# Patient Record
Sex: MALE | Race: WHITE | Employment: OTHER | URBAN - NONMETROPOLITAN AREA
[De-identification: names, ages, dates, MRNs, and addresses within clinical notes are randomized per-mention and may not be internally consistent; named-entity substitution may affect disease eponyms.]

---

## 2017-04-27 ENCOUNTER — HOSPITAL ENCOUNTER (OUTPATIENT)
Dept: NON INVASIVE DIAGNOSTICS | Age: 62
Discharge: HOME | End: 2017-04-27
Payer: COMMERCIAL

## 2017-04-27 LAB
LV EF: 50 %
LVEF MODALITY: NORMAL

## 2017-04-27 PROCEDURE — 93350 STRESS TTE ONLY: CPT

## 2017-05-25 LAB
C DIFFICILE TOXIN, EIA: NORMAL
CRYPTOSPORIDIUM ANTIGEN STOOL: NORMAL
GIARDIA ANTIGEN STOOL: NORMAL

## 2017-05-28 LAB
CULTURE, STOOL: NORMAL
E COLI SHIGA TOXIN ASSAY: NORMAL

## 2017-06-26 ENCOUNTER — OFFICE VISIT (OUTPATIENT)
Dept: INTERNAL MEDICINE | Age: 62
End: 2017-06-26
Payer: COMMERCIAL

## 2017-06-26 ENCOUNTER — OFFICE VISIT (OUTPATIENT)
Dept: GASTROENTEROLOGY | Age: 62
End: 2017-06-26
Payer: COMMERCIAL

## 2017-06-26 VITALS
WEIGHT: 138 LBS | DIASTOLIC BLOOD PRESSURE: 78 MMHG | SYSTOLIC BLOOD PRESSURE: 124 MMHG | HEART RATE: 95 BPM | BODY MASS INDEX: 19.76 KG/M2 | HEIGHT: 70 IN | OXYGEN SATURATION: 97 %

## 2017-06-26 VITALS
DIASTOLIC BLOOD PRESSURE: 76 MMHG | SYSTOLIC BLOOD PRESSURE: 110 MMHG | HEART RATE: 91 BPM | BODY MASS INDEX: 19.47 KG/M2 | HEIGHT: 70 IN | OXYGEN SATURATION: 96 % | WEIGHT: 136 LBS

## 2017-06-26 DIAGNOSIS — R19.7 DIARRHEA, UNSPECIFIED TYPE: ICD-10-CM

## 2017-06-26 DIAGNOSIS — R19.7 DIARRHEA, UNSPECIFIED TYPE: Primary | ICD-10-CM

## 2017-06-26 DIAGNOSIS — R53.83 FATIGUE, UNSPECIFIED TYPE: ICD-10-CM

## 2017-06-26 DIAGNOSIS — R63.4 UNINTENTIONAL WEIGHT LOSS: ICD-10-CM

## 2017-06-26 DIAGNOSIS — F41.1 GENERALIZED ANXIETY DISORDER: Primary | ICD-10-CM

## 2017-06-26 PROCEDURE — 99213 OFFICE O/P EST LOW 20 MIN: CPT | Performed by: INTERNAL MEDICINE

## 2017-06-26 RX ORDER — MONTELUKAST SODIUM 4 MG/1
1 TABLET, CHEWABLE ORAL 3 TIMES DAILY PRN
Qty: 90 TABLET | Refills: 3 | Status: SHIPPED | OUTPATIENT
Start: 2017-06-26 | End: 2017-07-05 | Stop reason: SDUPTHER

## 2017-06-26 RX ORDER — CLONAZEPAM 0.5 MG/1
0.5 TABLET ORAL NIGHTLY PRN
Qty: 90 TABLET | Refills: 0 | Status: SHIPPED | OUTPATIENT
Start: 2017-06-26 | End: 2017-12-19 | Stop reason: SDUPTHER

## 2017-06-26 ASSESSMENT — ENCOUNTER SYMPTOMS
SHORTNESS OF BREATH: 0
SORE THROAT: 0
ANAL BLEEDING: 0
PHOTOPHOBIA: 0
SHORTNESS OF BREATH: 0
ABDOMINAL PAIN: 0
DIARRHEA: 1
CHEST TIGHTNESS: 0
EYE DISCHARGE: 0
COUGH: 0
ABDOMINAL DISTENTION: 0
WHEEZING: 0
EYE ITCHING: 0
COLOR CHANGE: 0
TROUBLE SWALLOWING: 0
BACK PAIN: 0
COUGH: 0
COLOR CHANGE: 0
CONSTIPATION: 0
VOMITING: 0
NAUSEA: 0
DIARRHEA: 1
FACIAL SWELLING: 0
BLOOD IN STOOL: 0
RECTAL PAIN: 0
EYE PAIN: 0
SINUS PRESSURE: 0
EYE REDNESS: 0
RHINORRHEA: 0
ABDOMINAL PAIN: 1
BLOOD IN STOOL: 0

## 2017-06-26 ASSESSMENT — PATIENT HEALTH QUESTIONNAIRE - PHQ9
SUM OF ALL RESPONSES TO PHQ9 QUESTIONS 1 & 2: 0
1. LITTLE INTEREST OR PLEASURE IN DOING THINGS: 0
2. FEELING DOWN, DEPRESSED OR HOPELESS: 0
SUM OF ALL RESPONSES TO PHQ QUESTIONS 1-9: 0

## 2017-06-28 ENCOUNTER — HOSPITAL ENCOUNTER (OUTPATIENT)
Age: 62
Setting detail: OUTPATIENT SURGERY
Discharge: HOME | End: 2017-06-28
Attending: INTERNAL MEDICINE | Admitting: INTERNAL MEDICINE
Payer: COMMERCIAL

## 2017-06-28 ENCOUNTER — ANESTHESIA (OUTPATIENT)
Dept: OPERATING ROOM | Age: 62
End: 2017-06-28
Payer: COMMERCIAL

## 2017-06-28 ENCOUNTER — ANESTHESIA EVENT (OUTPATIENT)
Dept: OPERATING ROOM | Age: 62
End: 2017-06-28

## 2017-06-28 ENCOUNTER — HOSPITAL ENCOUNTER (OUTPATIENT)
Age: 62
Setting detail: SPECIMEN
Discharge: HOME | End: 2017-06-28
Payer: COMMERCIAL

## 2017-06-28 VITALS — DIASTOLIC BLOOD PRESSURE: 67 MMHG | SYSTOLIC BLOOD PRESSURE: 101 MMHG | OXYGEN SATURATION: 98 %

## 2017-06-28 VITALS
RESPIRATION RATE: 18 BRPM | TEMPERATURE: 98.5 F | BODY MASS INDEX: 19.33 KG/M2 | WEIGHT: 135 LBS | DIASTOLIC BLOOD PRESSURE: 69 MMHG | SYSTOLIC BLOOD PRESSURE: 95 MMHG | HEIGHT: 70 IN | HEART RATE: 74 BPM | OXYGEN SATURATION: 100 %

## 2017-06-28 LAB — C DIFFICILE TOXIN, EIA: NORMAL

## 2017-06-28 PROCEDURE — 00810 PR ANESTH,INTESTINE,SCOPE,LOW: CPT | Performed by: NURSE ANESTHETIST, CERTIFIED REGISTERED

## 2017-06-28 PROCEDURE — 87329 GIARDIA AG IA: CPT

## 2017-06-28 PROCEDURE — 87335 E COLI 0157 AG IA: CPT

## 2017-06-28 PROCEDURE — 87324 CLOSTRIDIUM AG IA: CPT

## 2017-06-28 PROCEDURE — 87046 STOOL CULTR AEROBIC BACT EA: CPT

## 2017-06-28 PROCEDURE — 45380 COLONOSCOPY AND BIOPSY: CPT | Performed by: INTERNAL MEDICINE

## 2017-06-28 PROCEDURE — 87328 CRYPTOSPORIDIUM AG IA: CPT

## 2017-06-28 PROCEDURE — 87045 FECES CULTURE AEROBIC BACT: CPT

## 2017-06-28 PROCEDURE — 45380 COLONOSCOPY AND BIOPSY: CPT

## 2017-06-28 PROCEDURE — G8918 PT W/O PREOP ORDER IV AB PRO: HCPCS | Performed by: NURSE PRACTITIONER

## 2017-06-28 PROCEDURE — G8907 PT DOC NO EVENTS ON DISCHARG: HCPCS | Performed by: NURSE PRACTITIONER

## 2017-06-28 PROCEDURE — 88305 TISSUE EXAM BY PATHOLOGIST: CPT

## 2017-06-28 RX ORDER — SODIUM CHLORIDE, SODIUM LACTATE, POTASSIUM CHLORIDE, CALCIUM CHLORIDE 600; 310; 30; 20 MG/100ML; MG/100ML; MG/100ML; MG/100ML
INJECTION, SOLUTION INTRAVENOUS CONTINUOUS
Status: DISCONTINUED | OUTPATIENT
Start: 2017-06-28 | End: 2017-06-28 | Stop reason: HOSPADM

## 2017-06-28 RX ORDER — PROPOFOL 10 MG/ML
INJECTION, EMULSION INTRAVENOUS PRN
Status: DISCONTINUED | OUTPATIENT
Start: 2017-06-28 | End: 2017-06-28 | Stop reason: SDUPTHER

## 2017-06-28 RX ADMIN — PROPOFOL 200 MG: 10 INJECTION, EMULSION INTRAVENOUS at 11:00

## 2017-06-28 RX ADMIN — SODIUM CHLORIDE, SODIUM LACTATE, POTASSIUM CHLORIDE, CALCIUM CHLORIDE: 600; 310; 30; 20 INJECTION, SOLUTION INTRAVENOUS at 10:48

## 2017-06-29 LAB
CRYPTOSPORIDIUM ANTIGEN STOOL: NORMAL
GIARDIA ANTIGEN STOOL: NORMAL

## 2017-07-05 RX ORDER — MONTELUKAST SODIUM 4 MG/1
1 TABLET, CHEWABLE ORAL 3 TIMES DAILY PRN
Qty: 270 TABLET | Refills: 0 | Status: SHIPPED | OUTPATIENT
Start: 2017-07-05 | End: 2017-10-06 | Stop reason: SDUPTHER

## 2017-07-06 LAB
CULTURE, STOOL: NORMAL
E COLI SHIGA TOXIN ASSAY: NORMAL

## 2017-07-10 ENCOUNTER — TELEPHONE (OUTPATIENT)
Dept: GASTROENTEROLOGY | Age: 62
End: 2017-07-10

## 2017-07-12 DIAGNOSIS — R19.7 DIARRHEA, UNSPECIFIED TYPE: Primary | ICD-10-CM

## 2017-07-25 RX ORDER — DICLOFENAC SODIUM 75 MG/1
75 TABLET, DELAYED RELEASE ORAL 2 TIMES DAILY
Qty: 60 TABLET | Refills: 2 | Status: SHIPPED | OUTPATIENT
Start: 2017-07-25 | End: 2017-07-28 | Stop reason: SDUPTHER

## 2017-07-28 RX ORDER — DICLOFENAC SODIUM 75 MG/1
75 TABLET, DELAYED RELEASE ORAL 2 TIMES DAILY
Qty: 60 TABLET | Refills: 2 | Status: SHIPPED | OUTPATIENT
Start: 2017-07-28 | End: 2017-10-17

## 2017-08-03 ENCOUNTER — TELEPHONE (OUTPATIENT)
Dept: INTERNAL MEDICINE | Age: 62
End: 2017-08-03

## 2017-08-14 ENCOUNTER — OFFICE VISIT (OUTPATIENT)
Dept: INTERNAL MEDICINE | Age: 62
End: 2017-08-14
Payer: COMMERCIAL

## 2017-08-14 ENCOUNTER — TELEPHONE (OUTPATIENT)
Dept: INTERNAL MEDICINE | Age: 62
End: 2017-08-14

## 2017-08-14 VITALS
DIASTOLIC BLOOD PRESSURE: 64 MMHG | WEIGHT: 137.5 LBS | SYSTOLIC BLOOD PRESSURE: 110 MMHG | HEIGHT: 70 IN | HEART RATE: 81 BPM | OXYGEN SATURATION: 93 % | BODY MASS INDEX: 19.69 KG/M2

## 2017-08-14 DIAGNOSIS — E53.8 B12 DEFICIENCY: Primary | ICD-10-CM

## 2017-08-14 DIAGNOSIS — R53.83 FATIGUE, UNSPECIFIED TYPE: Primary | ICD-10-CM

## 2017-08-14 DIAGNOSIS — R53.83 FATIGUE, UNSPECIFIED TYPE: ICD-10-CM

## 2017-08-14 LAB
ALBUMIN SERPL-MCNC: 4.4 G/DL (ref 3.5–5.2)
ALP BLD-CCNC: 93 U/L (ref 40–130)
ALT SERPL-CCNC: 24 U/L (ref 5–41)
ANION GAP SERPL CALCULATED.3IONS-SCNC: 14 MMOL/L (ref 7–19)
AST SERPL-CCNC: 17 U/L (ref 5–40)
BILIRUB SERPL-MCNC: 0.7 MG/DL (ref 0.2–1.2)
BUN BLDV-MCNC: 11 MG/DL (ref 8–23)
CALCIUM SERPL-MCNC: 9.7 MG/DL (ref 8.8–10.2)
CHLORIDE BLD-SCNC: 103 MMOL/L (ref 98–111)
CO2: 28 MMOL/L (ref 22–29)
CREAT SERPL-MCNC: 0.8 MG/DL (ref 0.5–1.2)
GFR NON-AFRICAN AMERICAN: >60
GLUCOSE BLD-MCNC: 85 MG/DL (ref 74–109)
HCT VFR BLD CALC: 45.3 % (ref 42–52)
HEMOGLOBIN: 15.3 G/DL (ref 14–18)
MCH RBC QN AUTO: 31.4 PG (ref 27–31)
MCHC RBC AUTO-ENTMCNC: 33.8 G/DL (ref 33–37)
MCV RBC AUTO: 92.8 FL (ref 80–94)
PDW BLD-RTO: 12 % (ref 11.5–14.5)
PLATELET # BLD: 196 K/UL (ref 130–400)
PMV BLD AUTO: 9.4 FL (ref 9.4–12.4)
POTASSIUM SERPL-SCNC: 4.4 MMOL/L (ref 3.5–5)
RBC # BLD: 4.88 M/UL (ref 4.7–6.1)
SODIUM BLD-SCNC: 145 MMOL/L (ref 136–145)
TOTAL PROTEIN: 6.9 G/DL (ref 6.6–8.7)
TSH SERPL DL<=0.05 MIU/L-ACNC: 1.66 UIU/ML (ref 0.27–4.2)
VITAMIN B-12: 240 PG/ML (ref 211–946)
WBC # BLD: 4 K/UL (ref 4.8–10.8)

## 2017-08-14 PROCEDURE — 99214 OFFICE O/P EST MOD 30 MIN: CPT | Performed by: INTERNAL MEDICINE

## 2017-08-18 ENCOUNTER — NURSE ONLY (OUTPATIENT)
Dept: INTERNAL MEDICINE | Age: 62
End: 2017-08-18
Payer: COMMERCIAL

## 2017-08-18 DIAGNOSIS — E53.8 B12 DEFICIENCY: ICD-10-CM

## 2017-08-18 PROCEDURE — 96372 THER/PROPH/DIAG INJ SC/IM: CPT | Performed by: INTERNAL MEDICINE

## 2017-08-18 RX ORDER — CYANOCOBALAMIN 1000 UG/ML
1000 INJECTION INTRAMUSCULAR; SUBCUTANEOUS ONCE
Status: COMPLETED | OUTPATIENT
Start: 2017-08-18 | End: 2017-08-18

## 2017-08-18 RX ADMIN — CYANOCOBALAMIN 1000 MCG: 1000 INJECTION INTRAMUSCULAR; SUBCUTANEOUS at 11:26

## 2017-08-21 ENCOUNTER — TELEPHONE (OUTPATIENT)
Dept: GASTROENTEROLOGY | Age: 62
End: 2017-08-21

## 2017-08-23 ASSESSMENT — ENCOUNTER SYMPTOMS
WHEEZING: 0
EYE DISCHARGE: 0
RECTAL PAIN: 0
SORE THROAT: 0
NAUSEA: 0
CHEST TIGHTNESS: 0
SINUS PRESSURE: 0
VOICE CHANGE: 0
DIARRHEA: 0
CONSTIPATION: 0
EYE REDNESS: 0
BACK PAIN: 0
CHOKING: 0
EYE ITCHING: 0
COUGH: 0
ABDOMINAL PAIN: 0
ANAL BLEEDING: 0
PHOTOPHOBIA: 0
TROUBLE SWALLOWING: 0
APNEA: 0
ABDOMINAL DISTENTION: 0
EYE PAIN: 0
FACIAL SWELLING: 0
STRIDOR: 0
VOMITING: 0
SHORTNESS OF BREATH: 0
BLOOD IN STOOL: 0
RHINORRHEA: 0
COLOR CHANGE: 0

## 2017-08-24 DIAGNOSIS — R19.7 DIARRHEA, UNSPECIFIED TYPE: ICD-10-CM

## 2017-09-11 ENCOUNTER — HOSPITAL ENCOUNTER (OUTPATIENT)
Dept: SLEEP CENTER | Age: 62
Discharge: HOME | End: 2017-09-11
Payer: COMMERCIAL

## 2017-09-11 PROCEDURE — 95810 POLYSOM 6/> YRS 4/> PARAM: CPT

## 2017-09-11 PROCEDURE — 95810 POLYSOM 6/> YRS 4/> PARAM: CPT | Performed by: PSYCHIATRY & NEUROLOGY

## 2017-09-13 ENCOUNTER — TELEPHONE (OUTPATIENT)
Dept: GASTROENTEROLOGY | Age: 62
End: 2017-09-13

## 2017-09-14 ENCOUNTER — TELEPHONE (OUTPATIENT)
Dept: GASTROENTEROLOGY | Age: 62
End: 2017-09-14

## 2017-09-18 ENCOUNTER — NURSE ONLY (OUTPATIENT)
Dept: INTERNAL MEDICINE | Age: 62
End: 2017-09-18
Payer: COMMERCIAL

## 2017-09-18 DIAGNOSIS — A04.72 C. DIFFICILE COLITIS: ICD-10-CM

## 2017-09-18 DIAGNOSIS — E53.8 B12 DEFICIENCY: Primary | ICD-10-CM

## 2017-09-18 DIAGNOSIS — R19.7 DIARRHEA, UNSPECIFIED TYPE: Primary | ICD-10-CM

## 2017-09-18 PROCEDURE — 96372 THER/PROPH/DIAG INJ SC/IM: CPT | Performed by: INTERNAL MEDICINE

## 2017-09-18 RX ORDER — CYANOCOBALAMIN 1000 UG/ML
1000 INJECTION INTRAMUSCULAR; SUBCUTANEOUS ONCE
Status: COMPLETED | OUTPATIENT
Start: 2017-09-18 | End: 2017-09-18

## 2017-09-18 RX ADMIN — CYANOCOBALAMIN 1000 MCG: 1000 INJECTION INTRAMUSCULAR; SUBCUTANEOUS at 11:24

## 2017-09-19 ENCOUNTER — TELEPHONE (OUTPATIENT)
Dept: GASTROENTEROLOGY | Age: 62
End: 2017-09-19

## 2017-09-25 ENCOUNTER — OFFICE VISIT (OUTPATIENT)
Dept: INTERNAL MEDICINE | Age: 62
End: 2017-09-25
Payer: COMMERCIAL

## 2017-09-25 VITALS
BODY MASS INDEX: 19.47 KG/M2 | SYSTOLIC BLOOD PRESSURE: 122 MMHG | HEIGHT: 70 IN | WEIGHT: 136 LBS | DIASTOLIC BLOOD PRESSURE: 68 MMHG | HEART RATE: 72 BPM | OXYGEN SATURATION: 97 %

## 2017-09-25 DIAGNOSIS — Z23 NEEDS FLU SHOT: ICD-10-CM

## 2017-09-25 DIAGNOSIS — A04.72 CLOSTRIDIUM DIFFICILE COLITIS: ICD-10-CM

## 2017-09-25 DIAGNOSIS — R53.83 FATIGUE, UNSPECIFIED TYPE: Primary | ICD-10-CM

## 2017-09-25 PROCEDURE — 90471 IMMUNIZATION ADMIN: CPT | Performed by: INTERNAL MEDICINE

## 2017-09-25 PROCEDURE — 90630 INFLUENZA, QUADV, 18-64 YRS, ID, PF, MICRO INJ, 0.1ML (FLUZONE QUADV, PF): CPT | Performed by: INTERNAL MEDICINE

## 2017-09-25 PROCEDURE — 99213 OFFICE O/P EST LOW 20 MIN: CPT | Performed by: INTERNAL MEDICINE

## 2017-09-25 ASSESSMENT — ENCOUNTER SYMPTOMS
SORE THROAT: 0
VOICE CHANGE: 0
CHEST TIGHTNESS: 0
EYE REDNESS: 0
FACIAL SWELLING: 0
EYE DISCHARGE: 0
SHORTNESS OF BREATH: 0
COUGH: 0
CHOKING: 0
ABDOMINAL PAIN: 0
DIARRHEA: 1
COLOR CHANGE: 0
BACK PAIN: 0
VOMITING: 0
TROUBLE SWALLOWING: 0
RECTAL PAIN: 0
EYE ITCHING: 0
ABDOMINAL DISTENTION: 0
STRIDOR: 0
CONSTIPATION: 0
APNEA: 0
NAUSEA: 0
WHEEZING: 0
ANAL BLEEDING: 0
EYE PAIN: 0
RHINORRHEA: 0
SINUS PRESSURE: 0
BLOOD IN STOOL: 0
PHOTOPHOBIA: 0

## 2017-09-25 ASSESSMENT — PATIENT HEALTH QUESTIONNAIRE - PHQ9
2. FEELING DOWN, DEPRESSED OR HOPELESS: 0
SUM OF ALL RESPONSES TO PHQ9 QUESTIONS 1 & 2: 0
SUM OF ALL RESPONSES TO PHQ QUESTIONS 1-9: 0
1. LITTLE INTEREST OR PLEASURE IN DOING THINGS: 0

## 2017-10-02 ENCOUNTER — TELEPHONE (OUTPATIENT)
Dept: GASTROENTEROLOGY | Age: 62
End: 2017-10-02

## 2017-10-02 NOTE — TELEPHONE ENCOUNTER
See last Phone Encounter dated 10-2-17. FU scheduled with  on 10-17-17. Patient called the office today said his formed stools were short lived, he is now back to diarrhea daily and wants to go ahead and complete the C-Diff stool testing, I did explain to the patient it can only be tested on watery diarrhea and no formed stools, he voiced understanding and said he has the watery diarrhea back again and would feel better getting the stool test, I will print off his stool lab order and he will come to the office today and pick this up. I reminded the patient to keep his FU appointment scheduled on 10-17-17.   pinky

## 2017-10-03 DIAGNOSIS — R19.7 DIARRHEA, UNSPECIFIED TYPE: ICD-10-CM

## 2017-10-03 DIAGNOSIS — A04.72 C. DIFFICILE COLITIS: ICD-10-CM

## 2017-10-03 LAB — C DIFFICILE TOXIN, EIA: NORMAL

## 2017-10-06 RX ORDER — MONTELUKAST SODIUM 4 MG/1
TABLET, CHEWABLE ORAL
Qty: 270 TABLET | Refills: 0 | Status: SHIPPED | OUTPATIENT
Start: 2017-10-06 | End: 2017-12-19 | Stop reason: SDUPTHER

## 2017-10-13 ENCOUNTER — NURSE ONLY (OUTPATIENT)
Dept: INTERNAL MEDICINE | Age: 62
End: 2017-10-13
Payer: COMMERCIAL

## 2017-10-13 DIAGNOSIS — E53.8 B12 DEFICIENCY: Primary | ICD-10-CM

## 2017-10-13 PROCEDURE — 96372 THER/PROPH/DIAG INJ SC/IM: CPT | Performed by: INTERNAL MEDICINE

## 2017-10-13 RX ORDER — CYANOCOBALAMIN 1000 UG/ML
1000 INJECTION INTRAMUSCULAR; SUBCUTANEOUS ONCE
Status: COMPLETED | OUTPATIENT
Start: 2017-10-13 | End: 2017-10-13

## 2017-10-13 RX ADMIN — CYANOCOBALAMIN 1000 MCG: 1000 INJECTION INTRAMUSCULAR; SUBCUTANEOUS at 13:30

## 2017-10-17 ENCOUNTER — TELEPHONE (OUTPATIENT)
Dept: GASTROENTEROLOGY | Age: 62
End: 2017-10-17

## 2017-10-17 ENCOUNTER — OFFICE VISIT (OUTPATIENT)
Dept: GASTROENTEROLOGY | Age: 62
End: 2017-10-17
Payer: COMMERCIAL

## 2017-10-17 VITALS
HEART RATE: 74 BPM | WEIGHT: 139.6 LBS | BODY MASS INDEX: 19.98 KG/M2 | OXYGEN SATURATION: 96 % | SYSTOLIC BLOOD PRESSURE: 115 MMHG | DIASTOLIC BLOOD PRESSURE: 70 MMHG | HEIGHT: 70 IN

## 2017-10-17 DIAGNOSIS — R19.7 DIARRHEA, UNSPECIFIED TYPE: Primary | ICD-10-CM

## 2017-10-17 DIAGNOSIS — R63.4 UNINTENTIONAL WEIGHT LOSS: ICD-10-CM

## 2017-10-17 PROCEDURE — 99213 OFFICE O/P EST LOW 20 MIN: CPT | Performed by: INTERNAL MEDICINE

## 2017-10-17 ASSESSMENT — ENCOUNTER SYMPTOMS
VOMITING: 0
ANAL BLEEDING: 0
BLOOD IN STOOL: 0
TROUBLE SWALLOWING: 0
CONSTIPATION: 0
ABDOMINAL PAIN: 0
COUGH: 0
RECTAL PAIN: 0
SHORTNESS OF BREATH: 0
ABDOMINAL DISTENTION: 0
DIARRHEA: 1
NAUSEA: 0

## 2017-10-17 NOTE — PROGRESS NOTES
, Rfl:     acyclovir (ZOVIRAX) 800 MG tablet, Take 800 mg by mouth 2 times daily, Disp: , Rfl:     Multiple Vitamins-Minerals (CENTRUM SILVER ULTRA MENS PO), Take by mouth, Disp: , Rfl:     Gluten meal and Erythromycin    Family History   Problem Relation Age of Onset    Heart Disease Mother     Arthritis Mother     Prostate Cancer Father     Kidney Disease Father     Heart Disease Father     Diabetes Father     Arthritis Sister     Diabetes Brother     Heart Disease Brother     Coronary Art Dis Brother     Colon Polyps Neg Hx     Esophageal Cancer Neg Hx     Liver Cancer Neg Hx     Liver Disease Neg Hx     Rectal Cancer Neg Hx     Stomach Cancer Neg Hx        Social History     Social History    Marital status:      Spouse name: N/A    Number of children: N/A    Years of education: N/A     Occupational History    Not on file. Social History Main Topics    Smoking status: Never Smoker    Smokeless tobacco: Never Used    Alcohol use Yes      Comment: rare    Drug use: No    Sexual activity: Yes     Partners: Female     Other Topics Concern    Not on file     Social History Narrative    No narrative on file         Review of Systems   Constitutional: Positive for unexpected weight change (stabel at this time). Negative for appetite change, chills and fever. HENT: Negative for trouble swallowing. Respiratory: Negative for cough and shortness of breath. Cardiovascular: Negative for chest pain. Gastrointestinal: Positive for diarrhea. Negative for abdominal distention, abdominal pain, anal bleeding, blood in stool, constipation, nausea, rectal pain and vomiting. Genitourinary: Negative for dysuria. Neurological: Negative for headaches. Psychiatric/Behavioral: The patient is not nervous/anxious. Objective:   Physical Exam   Constitutional: He is oriented to person, place, and time. He appears well-developed and well-nourished. HENT:   Head: Normocephalic. Eyes: Pupils are equal, round, and reactive to light. No scleral icterus. Neck: Normal range of motion. Neck supple. Cardiovascular: Normal rate, regular rhythm and normal heart sounds. No murmur heard. Pulmonary/Chest: Effort normal and breath sounds normal. No respiratory distress. Abdominal: Soft. Bowel sounds are normal. He exhibits no distension and no mass. There is no tenderness. There is no rebound and no guarding. Musculoskeletal: Normal range of motion. He exhibits no edema. Neurological: He is alert and oriented to person, place, and time. Assessment:      1. Diarrhea, unspecified type     2. Unintentional weight loss             Plan:      I recommend referral for a second opinion for other treatment options, including evaluation for fecal transplant. He is agreeable. We will work on a referral for him.

## 2017-11-10 ENCOUNTER — NURSE ONLY (OUTPATIENT)
Dept: INTERNAL MEDICINE | Age: 62
End: 2017-11-10
Payer: COMMERCIAL

## 2017-11-10 DIAGNOSIS — E53.8 B12 DEFICIENCY: Primary | ICD-10-CM

## 2017-11-10 PROCEDURE — 96372 THER/PROPH/DIAG INJ SC/IM: CPT | Performed by: INTERNAL MEDICINE

## 2017-11-10 RX ORDER — CYANOCOBALAMIN 1000 UG/ML
1000 INJECTION INTRAMUSCULAR; SUBCUTANEOUS ONCE
Status: COMPLETED | OUTPATIENT
Start: 2017-11-10 | End: 2017-11-10

## 2017-11-10 RX ADMIN — CYANOCOBALAMIN 1000 MCG: 1000 INJECTION INTRAMUSCULAR; SUBCUTANEOUS at 11:29

## 2017-11-10 NOTE — PATIENT INSTRUCTIONS
After obtaining consent, and per orders of Dr. Carly Rios, injection of b12 given in Right deltoid by Vaughan Hodgkin. Patient instructed to remain in clinic for 20 minutes afterwards, and to report any adverse reaction to me immediately.

## 2017-11-20 RX ORDER — DICLOFENAC SODIUM 75 MG/1
TABLET, DELAYED RELEASE ORAL
Qty: 60 TABLET | Refills: 2 | Status: SHIPPED | OUTPATIENT
Start: 2017-11-20 | End: 2018-03-26

## 2017-12-12 ENCOUNTER — NURSE ONLY (OUTPATIENT)
Dept: INTERNAL MEDICINE | Age: 62
End: 2017-12-12
Payer: COMMERCIAL

## 2017-12-12 DIAGNOSIS — Z23 NEEDS FLU SHOT: ICD-10-CM

## 2017-12-12 DIAGNOSIS — E53.8 B12 DEFICIENCY: Primary | ICD-10-CM

## 2017-12-12 LAB
BASOPHILS ABSOLUTE: 0 K/UL (ref 0–0.2)
BASOPHILS RELATIVE PERCENT: 0.5 % (ref 0–1)
EOSINOPHILS ABSOLUTE: 0.1 K/UL (ref 0–0.6)
EOSINOPHILS RELATIVE PERCENT: 1.4 % (ref 0–5)
HCT VFR BLD CALC: 49.2 % (ref 42–52)
HEMOGLOBIN: 17 G/DL (ref 14–18)
LYMPHOCYTES ABSOLUTE: 1.9 K/UL (ref 1.1–4.5)
LYMPHOCYTES RELATIVE PERCENT: 34.8 % (ref 20–40)
MCH RBC QN AUTO: 31.7 PG (ref 27–31)
MCHC RBC AUTO-ENTMCNC: 34.6 G/DL (ref 33–37)
MCV RBC AUTO: 91.8 FL (ref 80–94)
MONOCYTES ABSOLUTE: 0.4 K/UL (ref 0–0.9)
MONOCYTES RELATIVE PERCENT: 7.5 % (ref 0–10)
NEUTROPHILS ABSOLUTE: 3.1 K/UL (ref 1.5–7.5)
NEUTROPHILS RELATIVE PERCENT: 55.6 % (ref 50–65)
PDW BLD-RTO: 12.2 % (ref 11.5–14.5)
PLATELET # BLD: 231 K/UL (ref 130–400)
PMV BLD AUTO: 9.1 FL (ref 9.4–12.4)
RBC # BLD: 5.36 M/UL (ref 4.7–6.1)
VITAMIN B-12: 383 PG/ML (ref 211–946)
WBC # BLD: 5.6 K/UL (ref 4.8–10.8)

## 2017-12-12 PROCEDURE — 96372 THER/PROPH/DIAG INJ SC/IM: CPT | Performed by: INTERNAL MEDICINE

## 2017-12-12 RX ORDER — CYANOCOBALAMIN 1000 UG/ML
1000 INJECTION INTRAMUSCULAR; SUBCUTANEOUS ONCE
Status: COMPLETED | OUTPATIENT
Start: 2017-12-12 | End: 2017-12-12

## 2017-12-12 RX ADMIN — CYANOCOBALAMIN 1000 MCG: 1000 INJECTION INTRAMUSCULAR; SUBCUTANEOUS at 11:23

## 2017-12-12 NOTE — PROGRESS NOTES
After obtaining consent, and per orders of Dr. Erasto Moya, injection of B12 given in Right deltoid by Melia Eaton. Patient instructed to remain in clinic for 20 minutes afterwards, and to report any adverse reaction to me immediately.

## 2017-12-13 ENCOUNTER — TELEPHONE (OUTPATIENT)
Dept: GASTROENTEROLOGY | Age: 62
End: 2017-12-13

## 2017-12-13 DIAGNOSIS — A04.72 C. DIFFICILE COLITIS: Primary | ICD-10-CM

## 2017-12-13 DIAGNOSIS — R19.7 DIARRHEA, UNSPECIFIED TYPE: ICD-10-CM

## 2017-12-13 DIAGNOSIS — R63.4 UNINTENTIONAL WEIGHT LOSS: ICD-10-CM

## 2017-12-13 NOTE — TELEPHONE ENCOUNTER
Dr Margret Galan,    Patient called today and stated that he saw Ole Izquierdo NP at [facility]. He called to tell me about his visit stating that she told him she would look over the records we sent and the information he told her and she would call him back this week with a plan. He said that she wanted another c diff and he wanted to know if you will order that for him or does he need to let her do that? His records were sent the day I referred him back in October, so I am not sure why she told him that. Please let me know and I will call him back [phone]. FYI, he is still scheduled to see Emily Hernandez, he was going to [place] first because he got in sooner with them.

## 2017-12-19 ENCOUNTER — OFFICE VISIT (OUTPATIENT)
Dept: INTERNAL MEDICINE | Age: 62
End: 2017-12-19
Payer: COMMERCIAL

## 2017-12-19 VITALS
HEART RATE: 77 BPM | OXYGEN SATURATION: 98 % | HEIGHT: 71 IN | SYSTOLIC BLOOD PRESSURE: 100 MMHG | BODY MASS INDEX: 19.95 KG/M2 | DIASTOLIC BLOOD PRESSURE: 68 MMHG | WEIGHT: 142.5 LBS

## 2017-12-19 DIAGNOSIS — K52.9 CHRONIC DIARRHEA: ICD-10-CM

## 2017-12-19 DIAGNOSIS — E53.8 B12 DEFICIENCY: Primary | ICD-10-CM

## 2017-12-19 DIAGNOSIS — Z91.09 ENVIRONMENTAL ALLERGIES: ICD-10-CM

## 2017-12-19 DIAGNOSIS — M25.50 ARTHRALGIA, UNSPECIFIED JOINT: ICD-10-CM

## 2017-12-19 DIAGNOSIS — F41.9 ANXIETY: ICD-10-CM

## 2017-12-19 PROCEDURE — 99214 OFFICE O/P EST MOD 30 MIN: CPT | Performed by: INTERNAL MEDICINE

## 2017-12-19 RX ORDER — CLONAZEPAM 0.5 MG/1
0.5 TABLET ORAL NIGHTLY PRN
Qty: 90 TABLET | Refills: 1 | Status: SHIPPED | OUTPATIENT
Start: 2017-12-19 | End: 2018-03-26 | Stop reason: SDUPTHER

## 2017-12-19 RX ORDER — AMITRIPTYLINE HYDROCHLORIDE 10 MG/1
10 TABLET, FILM COATED ORAL NIGHTLY
Qty: 90 TABLET | Refills: 3 | Status: SHIPPED | OUTPATIENT
Start: 2017-12-19

## 2017-12-19 RX ORDER — CYANOCOBALAMIN 1000 UG/ML
1000 INJECTION INTRAMUSCULAR; SUBCUTANEOUS ONCE
Qty: 1 ML | Refills: 0 | Status: SHIPPED | OUTPATIENT
Start: 2017-12-19 | End: 2018-01-17 | Stop reason: SDUPTHER

## 2017-12-19 RX ORDER — MONTELUKAST SODIUM 4 MG/1
TABLET, CHEWABLE ORAL
Qty: 270 TABLET | Refills: 3 | Status: SHIPPED | OUTPATIENT
Start: 2017-12-19

## 2017-12-19 ASSESSMENT — PATIENT HEALTH QUESTIONNAIRE - PHQ9
SUM OF ALL RESPONSES TO PHQ QUESTIONS 1-9: 0
1. LITTLE INTEREST OR PLEASURE IN DOING THINGS: 0
2. FEELING DOWN, DEPRESSED OR HOPELESS: 0
SUM OF ALL RESPONSES TO PHQ9 QUESTIONS 1 & 2: 0

## 2017-12-20 ASSESSMENT — ENCOUNTER SYMPTOMS
PHOTOPHOBIA: 0
DIARRHEA: 1
FACIAL SWELLING: 0
VOICE CHANGE: 0
APNEA: 0
SINUS PRESSURE: 0
VOMITING: 0
CONSTIPATION: 0
RHINORRHEA: 0
CHOKING: 0
STRIDOR: 0
RECTAL PAIN: 0
SORE THROAT: 0
ABDOMINAL DISTENTION: 0
EYE ITCHING: 0
BACK PAIN: 0
EYE REDNESS: 0
NAUSEA: 0
ANAL BLEEDING: 0
ABDOMINAL PAIN: 0
WHEEZING: 0
EYE PAIN: 0
EYE DISCHARGE: 0
CHEST TIGHTNESS: 0
BLOOD IN STOOL: 0
COUGH: 0
SHORTNESS OF BREATH: 0
COLOR CHANGE: 0
TROUBLE SWALLOWING: 0

## 2017-12-20 NOTE — PROGRESS NOTES
ear normal.   Nose: Nose normal.   Mouth/Throat: Oropharynx is clear and moist. No oropharyngeal exudate. Eyes: Conjunctivae and EOM are normal. Pupils are equal, round, and reactive to light. Right eye exhibits no discharge. Left eye exhibits no discharge. No scleral icterus. Neck: Normal range of motion. Neck supple. No JVD present. No tracheal deviation present. No thyromegaly present. Cardiovascular: Normal rate, regular rhythm, normal heart sounds and intact distal pulses. Exam reveals no gallop and no friction rub. No murmur heard. Pulmonary/Chest: Effort normal and breath sounds normal. No respiratory distress. He has no wheezes. He has no rales. He exhibits no tenderness. Abdominal: Soft. Bowel sounds are normal. He exhibits no distension and no mass. There is no tenderness. There is no rebound and no guarding. Musculoskeletal: Normal range of motion. He exhibits no edema, tenderness or deformity. Lymphadenopathy:     He has no cervical adenopathy. Neurological: He is alert and oriented to person, place, and time. He displays normal reflexes. No cranial nerve deficit. He exhibits normal muscle tone. Coordination normal.   Skin: Skin is warm and dry. No rash noted. He is not diaphoretic. No erythema. No pallor. Psychiatric: He has a normal mood and affect. His behavior is normal. Judgment and thought content normal.   Nursing note and vitals reviewed. 1. B12 deficiency        ASSESSMENT/PLAN:    26-year-old male here for follow-up    1. B12 deficiency: B12 level is slowly increasing. Fatigue is improving. 2. Anxiety: Doing well with Klonopin    3. Chronic diarrhea: Workup is in progress    4. Environmental allergies: Stable    5. Joint pain: Continue diclofenac      Rileymario Hills was seen today for fatigue and 3 month follow-up. Diagnoses and all orders for this visit:    B12 deficiency  -     CBC Auto Differential; Future  -     Comprehensive Metabolic Panel;  Future  -     Lipid Panel; Future  -     TSH without Reflex; Future  -     Psa screening; Future  -     Vitamin B12; Future    Other orders  -     clonazePAM (KLONOPIN) 0.5 MG tablet; Take 1 tablet by mouth nightly as needed for Anxiety . -     amitriptyline (ELAVIL) 10 MG tablet; Take 1 tablet by mouth nightly  -     colestipol (COLESTID) 1 g tablet; TAKE 1 TABLET THREE TIMES A DAY AS NEEDED FOR DIARRHEA  -     cyanocobalamin 1000 MCG/ML injection;  Inject 1 mL into the muscle once for 1 dose          Return in about 3 months (around 3/19/2018) for physical.     Orders Placed This Encounter   Procedures    CBC Auto Differential     Fast 12 hours     Standing Status:   Future     Standing Expiration Date:   4/19/2018    Comprehensive Metabolic Panel     Fasting 12 hours     Standing Status:   Future     Standing Expiration Date:   4/19/2018    Lipid Panel     Standing Status:   Future     Standing Expiration Date:   4/19/2018     Order Specific Question:   Is Patient Fasting?/# of Hours     Answer:   12    TSH without Reflex     Fast 12 hours     Standing Status:   Future     Standing Expiration Date:   4/19/2018    Psa screening     Standing Status:   Future     Standing Expiration Date:   4/19/2018    Vitamin B12     Standing Status:   Future     Standing Expiration Date:   4/19/2018       Jassi Gracia MD

## 2018-01-17 RX ORDER — CYANOCOBALAMIN 1000 UG/ML
INJECTION INTRAMUSCULAR; SUBCUTANEOUS
Qty: 1 ML | Refills: 11 | Status: SHIPPED | OUTPATIENT
Start: 2018-01-17 | End: 2018-04-05 | Stop reason: SDUPTHER

## 2018-01-22 ENCOUNTER — TELEPHONE (OUTPATIENT)
Dept: GASTROENTEROLOGY | Age: 63
End: 2018-01-22

## 2018-02-13 ENCOUNTER — NURSE ONLY (OUTPATIENT)
Dept: INTERNAL MEDICINE | Age: 63
End: 2018-02-13
Payer: COMMERCIAL

## 2018-02-13 DIAGNOSIS — E53.8 VITAMIN B 12 DEFICIENCY: Primary | ICD-10-CM

## 2018-02-13 PROCEDURE — 96372 THER/PROPH/DIAG INJ SC/IM: CPT | Performed by: INTERNAL MEDICINE

## 2018-02-13 RX ORDER — CYANOCOBALAMIN 1000 UG/ML
1000 INJECTION INTRAMUSCULAR; SUBCUTANEOUS ONCE
Status: COMPLETED | OUTPATIENT
Start: 2018-02-13 | End: 2018-02-13

## 2018-02-13 RX ADMIN — CYANOCOBALAMIN 1000 MCG: 1000 INJECTION INTRAMUSCULAR; SUBCUTANEOUS at 15:54

## 2018-03-12 ENCOUNTER — NURSE ONLY (OUTPATIENT)
Dept: INTERNAL MEDICINE | Age: 63
End: 2018-03-12
Payer: COMMERCIAL

## 2018-03-12 DIAGNOSIS — R53.83 OTHER FATIGUE: Primary | ICD-10-CM

## 2018-03-12 PROCEDURE — 96372 THER/PROPH/DIAG INJ SC/IM: CPT | Performed by: INTERNAL MEDICINE

## 2018-03-12 RX ORDER — CYANOCOBALAMIN 1000 UG/ML
1000 INJECTION INTRAMUSCULAR; SUBCUTANEOUS ONCE
Status: COMPLETED | OUTPATIENT
Start: 2018-03-12 | End: 2018-03-12

## 2018-03-12 RX ADMIN — CYANOCOBALAMIN 1000 MCG: 1000 INJECTION INTRAMUSCULAR; SUBCUTANEOUS at 16:06

## 2018-03-20 DIAGNOSIS — E53.8 B12 DEFICIENCY: ICD-10-CM

## 2018-03-20 LAB
ALBUMIN SERPL-MCNC: 4.4 G/DL (ref 3.5–5.2)
ALP BLD-CCNC: 76 U/L (ref 40–130)
ALT SERPL-CCNC: 20 U/L (ref 5–41)
ANION GAP SERPL CALCULATED.3IONS-SCNC: 12 MMOL/L (ref 7–19)
AST SERPL-CCNC: 14 U/L (ref 5–40)
BASOPHILS ABSOLUTE: 0 K/UL (ref 0–0.2)
BASOPHILS RELATIVE PERCENT: 0.5 % (ref 0–1)
BILIRUB SERPL-MCNC: 0.8 MG/DL (ref 0.2–1.2)
BUN BLDV-MCNC: 11 MG/DL (ref 8–23)
CALCIUM SERPL-MCNC: 9.6 MG/DL (ref 8.8–10.2)
CHLORIDE BLD-SCNC: 100 MMOL/L (ref 98–111)
CHOLESTEROL, TOTAL: 166 MG/DL (ref 160–199)
CO2: 31 MMOL/L (ref 22–29)
CREAT SERPL-MCNC: 0.9 MG/DL (ref 0.5–1.2)
EOSINOPHILS ABSOLUTE: 0.2 K/UL (ref 0–0.6)
EOSINOPHILS RELATIVE PERCENT: 2.7 % (ref 0–5)
GFR NON-AFRICAN AMERICAN: >60
GLUCOSE BLD-MCNC: 89 MG/DL (ref 74–109)
HCT VFR BLD CALC: 47.8 % (ref 42–52)
HDLC SERPL-MCNC: 71 MG/DL (ref 55–121)
HEMOGLOBIN: 16.2 G/DL (ref 14–18)
LDL CHOLESTEROL CALCULATED: 71 MG/DL
LYMPHOCYTES ABSOLUTE: 2.6 K/UL (ref 1.1–4.5)
LYMPHOCYTES RELATIVE PERCENT: 44.8 % (ref 20–40)
MCH RBC QN AUTO: 30.6 PG (ref 27–31)
MCHC RBC AUTO-ENTMCNC: 33.9 G/DL (ref 33–37)
MCV RBC AUTO: 90.4 FL (ref 80–94)
MONOCYTES ABSOLUTE: 0.4 K/UL (ref 0–0.9)
MONOCYTES RELATIVE PERCENT: 6.3 % (ref 0–10)
NEUTROPHILS ABSOLUTE: 2.7 K/UL (ref 1.5–7.5)
NEUTROPHILS RELATIVE PERCENT: 45.5 % (ref 50–65)
PDW BLD-RTO: 12.2 % (ref 11.5–14.5)
PLATELET # BLD: 229 K/UL (ref 130–400)
PMV BLD AUTO: 8.8 FL (ref 9.4–12.4)
POTASSIUM SERPL-SCNC: 3.9 MMOL/L (ref 3.5–5)
PROSTATE SPECIFIC ANTIGEN: 1.28 NG/ML (ref 0–4)
RBC # BLD: 5.29 M/UL (ref 4.7–6.1)
SODIUM BLD-SCNC: 143 MMOL/L (ref 136–145)
TOTAL PROTEIN: 6.8 G/DL (ref 6.6–8.7)
TRIGL SERPL-MCNC: 121 MG/DL (ref 0–149)
TSH SERPL DL<=0.05 MIU/L-ACNC: 2.92 UIU/ML (ref 0.27–4.2)
VITAMIN B-12: 506 PG/ML (ref 211–946)
WBC # BLD: 5.9 K/UL (ref 4.8–10.8)

## 2018-03-26 ENCOUNTER — TELEPHONE (OUTPATIENT)
Dept: INTERNAL MEDICINE | Age: 63
End: 2018-03-26

## 2018-03-26 ENCOUNTER — OFFICE VISIT (OUTPATIENT)
Dept: INTERNAL MEDICINE | Age: 63
End: 2018-03-26
Payer: COMMERCIAL

## 2018-03-26 VITALS
SYSTOLIC BLOOD PRESSURE: 138 MMHG | HEIGHT: 70 IN | BODY MASS INDEX: 19.47 KG/M2 | OXYGEN SATURATION: 98 % | DIASTOLIC BLOOD PRESSURE: 82 MMHG | WEIGHT: 136 LBS | HEART RATE: 98 BPM

## 2018-03-26 DIAGNOSIS — F41.9 ANXIETY: ICD-10-CM

## 2018-03-26 DIAGNOSIS — K52.831 COLLAGENOUS COLITIS: Primary | ICD-10-CM

## 2018-03-26 DIAGNOSIS — M25.50 ARTHRALGIA, UNSPECIFIED JOINT: ICD-10-CM

## 2018-03-26 DIAGNOSIS — R53.83 OTHER FATIGUE: ICD-10-CM

## 2018-03-26 PROCEDURE — 99214 OFFICE O/P EST MOD 30 MIN: CPT | Performed by: INTERNAL MEDICINE

## 2018-03-26 RX ORDER — CLONAZEPAM 0.5 MG/1
0.5 TABLET ORAL NIGHTLY PRN
Qty: 90 TABLET | Refills: 1 | Status: SHIPPED | OUTPATIENT
Start: 2018-03-26 | End: 2018-05-15

## 2018-03-26 RX ORDER — CYANOCOBALAMIN 1000 UG/ML
1000 INJECTION INTRAMUSCULAR; SUBCUTANEOUS ONCE
Qty: 10 ML | Refills: 3 | Status: SHIPPED | OUTPATIENT
Start: 2018-03-26 | End: 2018-05-15 | Stop reason: SDUPTHER

## 2018-03-26 RX ORDER — BUDESONIDE 3 MG/1
CAPSULE, COATED PELLETS ORAL
Refills: 0 | COMMUNITY
Start: 2018-03-20

## 2018-03-26 ASSESSMENT — ENCOUNTER SYMPTOMS
CHOKING: 0
ANAL BLEEDING: 0
BLOOD IN STOOL: 0
SINUS PRESSURE: 0
EYE REDNESS: 0
BACK PAIN: 0
STRIDOR: 0
WHEEZING: 0
COUGH: 0
DIARRHEA: 1
APNEA: 0
SHORTNESS OF BREATH: 0
VOICE CHANGE: 0
COLOR CHANGE: 0
EYE ITCHING: 0
TROUBLE SWALLOWING: 0
EYE PAIN: 0
PHOTOPHOBIA: 0
ABDOMINAL PAIN: 0
RECTAL PAIN: 0
SORE THROAT: 0
CONSTIPATION: 0
RHINORRHEA: 0
EYE DISCHARGE: 0
CHEST TIGHTNESS: 0
FACIAL SWELLING: 0
NAUSEA: 0
VOMITING: 0
ABDOMINAL DISTENTION: 0

## 2018-03-26 NOTE — PROGRESS NOTES
Chief Complaint   Patient presents with    3 Month Follow-Up     patient states he has been doing good       HPI: Michelle Moore is a 58 y.o. male is here for Follow-up of diarrhea, fatigue, and chronic joint pain. He has been back down to [facility]. A colonoscopy that showed microscopic cholangitis. He is currently being treated with budesonide. The diarrhea has resolved and he's now having solid stools. He does have some diarrhea in the mornings occasionally, but this is much improved. His joint pain is better since she's been started on steroids. He seldom has to use the tramadol for pain. All of his major joints hurt including hips, knees, shoulders, elbows etc.    Anxiety is much improved. He only takes that Xanax as needed. B12 is helpful for fatigue. His allergies are stable. He has no major complaints or concerns today.     Past Medical History:   Diagnosis Date    Abdominal pain     Acute sinusitis     Allergic rhinitis     C. difficile diarrhea     Diverticulitis     Elevated AST (SGOT)     Gastroenteritis     GERD (gastroesophageal reflux disease)     Herpes simplex conjunctivitis     Just left eye    Insomnia     Lateral epicondylitis     Lumbago     LVH (left ventricular hypertrophy)     Mitral regurgitation     Neck strain     Prostatitis     Restless legs syndrome     RLS (restless legs syndrome)       Past Surgical History:   Procedure Laterality Date    ARM SURGERY Bilateral     CHOLECYSTECTOMY      COLONOSCOPY  2014    Dr. Lai Slider    FASCIOTOMY      lateral epicondyle elbow open with tendon reattachment    KNEE ARTHROSCOPY Left     with medial meniscus repair    KNEE ARTHROSCOPY      with abrasion arthroplasty    KNEE SURGERY Right     DC COLSC FLX W/RMVL OF TUMOR POLYP LESION SNARE TQ N/A 6/28/2017    Dr Ajit Rivera diverticulosis, BCM, stools (-)    [facility]OSCOPY  2015    [facility]    UPPER GASTROINTESTINAL ENDOSCOPY  2014    Dr. Lai Slider      Social History Social History    Marital status:      Spouse name: N/A    Number of children: N/A    Years of education: N/A     Social History Main Topics    Smoking status: Never Smoker    Smokeless tobacco: Never Used    Alcohol use Yes      Comment: rare    Drug use: No    Sexual activity: Yes     Partners: Female     Other Topics Concern    None     Social History Narrative    None      Family History   Problem Relation Age of Onset    Heart Disease Mother      mitral valve replacement    Arthritis Mother     Prostate Cancer Father     Kidney Disease Father      malignant neoplasm kidney    Heart Disease Father     Diabetes Father     Heart Failure Father     Other Father      temporal arteritis    Arthritis Sister     Diabetes Brother     Heart Disease Brother     Coronary Art Dis Brother     Colon Polyps Neg Hx     Esophageal Cancer Neg Hx     Liver Cancer Neg Hx     Liver Disease Neg Hx     Rectal Cancer Neg Hx     Stomach Cancer Neg Hx         Current Outpatient Prescriptions   Medication Sig Dispense Refill    budesonide (ENTOCORT EC) 3 MG extended release capsule TAKE 3 CAPSULES (9 MG TOTAL) BY MOUTH EVERY MORNING FOR 28 DAYS.  0    clonazePAM (KLONOPIN) 0.5 MG tablet Take 1 tablet by mouth nightly as needed for Anxiety for up to 30 days. 90 tablet 1    cyanocobalamin 1000 MCG/ML injection Inject 1 mL into the muscle once for 1 dose 10 mL 3    traMADol-acetaminophen (ULTRACET) 37.5-325 MG per tablet Take 1 tablet by mouth 2 times daily as needed for Pain.  60 tablet 0    cyanocobalamin 1000 MCG/ML injection INJECT 1 ML INTO THE MUSCLE ONCE FOR ONE DOSE 1 mL 11    amitriptyline (ELAVIL) 10 MG tablet Take 1 tablet by mouth nightly (Patient taking differently: Take 10 mg by mouth nightly 2 tabs at night) 90 tablet 3    Probiotic Product (PROBIOTIC DAILY PO) Take by mouth      Fiber Complete TABS Take by mouth      montelukast (SINGULAIR) 10 MG tablet Take 10 mg by mouth Coordination normal.   Skin: Skin is warm and dry. No rash noted. He is not diaphoretic. No erythema. No pallor. Psychiatric: He has a normal mood and affect. His behavior is normal. Judgment and thought content normal.   Nursing note and vitals reviewed. 1. Collagenous colitis        ASSESSMENT/PLAN:    79-year-old male here for follow-up    1. Microscopic collagenous colitis: Continue budesonide as per gastroenterology. Symptoms are much improved    2. Anxiety: Stable    3. Fatigue: Much improved with B12 injections    4. Joint pain: Much improved with steroids. Continue tramadol as needed for pain. Jad Butler was seen today for 3 month follow-up. Diagnoses and all orders for this visit:    Collagenous colitis  -     CBC Auto Differential; Future  -     Comprehensive Metabolic Panel; Future  -     Lipid Panel; Future  -     TSH without Reflex; Future  -     Vitamin B12; Future  -     Vitamin D 1,25 Dihydroxy; Future    Other orders  -     clonazePAM (KLONOPIN) 0.5 MG tablet; Take 1 tablet by mouth nightly as needed for Anxiety for up to 30 days. -     cyanocobalamin 1000 MCG/ML injection;  Inject 1 mL into the muscle once for 1 dose          Return in about 7 weeks (around 5/15/2018) for physical.     Orders Placed This Encounter   Procedures    CBC Auto Differential     Fast 12 hours     Standing Status:   Future     Standing Expiration Date:   7/26/2018    Comprehensive Metabolic Panel     Fasting 12 hours     Standing Status:   Future     Standing Expiration Date:   7/26/2018    Lipid Panel     Standing Status:   Future     Standing Expiration Date:   7/26/2018     Order Specific Question:   Is Patient Fasting?/# of Hours     Answer:   12    TSH without Reflex     Fast 12 hours     Standing Status:   Future     Standing Expiration Date:   7/26/2018    Vitamin B12     Standing Status:   Future     Standing Expiration Date:   7/26/2018    Vitamin D 1,25 Dihydroxy     Standing Status:   Future Standing Expiration Date:   7/26/2018       Fuentes Resendiz MD

## 2018-03-26 NOTE — PATIENT INSTRUCTIONS
maroon or very bloody. ?Call your doctor now or seek immediate medical care if:  ? · You have new or worse belly pain. ? · You have a fever. ? · You are vomiting. ? · You cannot pass stools or gas. ? · You have new or more blood in your stools. ? Watch closely for changes in your health, and be sure to contact your doctor if:  ? · You have new or worse symptoms. ? · You are losing weight. ? · You do not get better as expected. Where can you learn more? Go to [email or web address] org and sign in to your [employer] account. Enter P840 in the Altobridge box to learn more about \"Colitis: Care Instructions. \"     If you do not have an account, please click on the \"Sign Up Now\" link. Current as of: May 12, 2017  Content Version: 11.5  © 8819-0346 Healthwise, Incorporated. Care instructions adapted under license by [facility] ([facility]). If you have questions about a medical condition or this instruction, always ask your healthcare professional. [address] any warranty or liability for your use of this information.

## 2018-04-05 RX ORDER — CYANOCOBALAMIN 1000 UG/ML
INJECTION INTRAMUSCULAR; SUBCUTANEOUS
Qty: 10 ML | Refills: 3 | Status: SHIPPED | OUTPATIENT
Start: 2018-04-05

## 2018-04-05 RX ORDER — CYANOCOBALAMIN 1000 UG/ML
INJECTION INTRAMUSCULAR; SUBCUTANEOUS
Qty: 1 ML | Refills: 11 | Status: SHIPPED | OUTPATIENT
Start: 2018-04-05 | End: 2018-04-05 | Stop reason: SDUPTHER

## 2018-04-10 ENCOUNTER — NURSE ONLY (OUTPATIENT)
Dept: INTERNAL MEDICINE | Age: 63
End: 2018-04-10
Payer: COMMERCIAL

## 2018-04-10 DIAGNOSIS — R53.82 CHRONIC FATIGUE: Primary | ICD-10-CM

## 2018-04-10 PROCEDURE — 96372 THER/PROPH/DIAG INJ SC/IM: CPT | Performed by: INTERNAL MEDICINE

## 2018-04-10 RX ORDER — CYANOCOBALAMIN 1000 UG/ML
1000 INJECTION INTRAMUSCULAR; SUBCUTANEOUS ONCE
Status: COMPLETED | OUTPATIENT
Start: 2018-04-10 | End: 2018-04-10

## 2018-04-10 RX ADMIN — CYANOCOBALAMIN 1000 MCG: 1000 INJECTION INTRAMUSCULAR; SUBCUTANEOUS at 15:33

## 2018-05-03 ENCOUNTER — NURSE ONLY (OUTPATIENT)
Dept: INTERNAL MEDICINE | Age: 63
End: 2018-05-03
Payer: COMMERCIAL

## 2018-05-03 DIAGNOSIS — R53.82 CHRONIC FATIGUE: Primary | ICD-10-CM

## 2018-05-03 PROCEDURE — 96372 THER/PROPH/DIAG INJ SC/IM: CPT | Performed by: INTERNAL MEDICINE

## 2018-05-03 RX ORDER — CYANOCOBALAMIN 1000 UG/ML
1000 INJECTION INTRAMUSCULAR; SUBCUTANEOUS ONCE
Status: COMPLETED | OUTPATIENT
Start: 2018-05-03 | End: 2018-05-03

## 2018-05-03 RX ADMIN — CYANOCOBALAMIN 1000 MCG: 1000 INJECTION INTRAMUSCULAR; SUBCUTANEOUS at 13:20

## 2018-05-07 DIAGNOSIS — K52.831 COLLAGENOUS COLITIS: ICD-10-CM

## 2018-05-07 LAB
ALBUMIN SERPL-MCNC: 4.6 G/DL (ref 3.5–5.2)
ALP BLD-CCNC: 82 U/L (ref 40–130)
ALT SERPL-CCNC: 19 U/L (ref 5–41)
ANION GAP SERPL CALCULATED.3IONS-SCNC: 15 MMOL/L (ref 7–19)
AST SERPL-CCNC: 14 U/L (ref 5–40)
BASOPHILS ABSOLUTE: 0 K/UL (ref 0–0.2)
BASOPHILS RELATIVE PERCENT: 0.7 % (ref 0–1)
BILIRUB SERPL-MCNC: 0.8 MG/DL (ref 0.2–1.2)
BUN BLDV-MCNC: 14 MG/DL (ref 8–23)
CALCIUM SERPL-MCNC: 10 MG/DL (ref 8.8–10.2)
CHLORIDE BLD-SCNC: 102 MMOL/L (ref 98–111)
CHOLESTEROL, TOTAL: 172 MG/DL (ref 160–199)
CO2: 29 MMOL/L (ref 22–29)
CREAT SERPL-MCNC: 0.8 MG/DL (ref 0.5–1.2)
EOSINOPHILS ABSOLUTE: 0.1 K/UL (ref 0–0.6)
EOSINOPHILS RELATIVE PERCENT: 3 % (ref 0–5)
GFR NON-AFRICAN AMERICAN: >60
GLUCOSE BLD-MCNC: 94 MG/DL (ref 74–109)
HCT VFR BLD CALC: 47.5 % (ref 42–52)
HDLC SERPL-MCNC: 79 MG/DL (ref 55–121)
HEMOGLOBIN: 16 G/DL (ref 14–18)
LDL CHOLESTEROL CALCULATED: 80 MG/DL
LYMPHOCYTES ABSOLUTE: 1.8 K/UL (ref 1.1–4.5)
LYMPHOCYTES RELATIVE PERCENT: 41.9 % (ref 20–40)
MCH RBC QN AUTO: 31.2 PG (ref 27–31)
MCHC RBC AUTO-ENTMCNC: 33.7 G/DL (ref 33–37)
MCV RBC AUTO: 92.6 FL (ref 80–94)
MONOCYTES ABSOLUTE: 0.4 K/UL (ref 0–0.9)
MONOCYTES RELATIVE PERCENT: 8.1 % (ref 0–10)
NEUTROPHILS ABSOLUTE: 2 K/UL (ref 1.5–7.5)
NEUTROPHILS RELATIVE PERCENT: 46.3 % (ref 50–65)
PDW BLD-RTO: 12.1 % (ref 11.5–14.5)
PLATELET # BLD: 186 K/UL (ref 130–400)
PMV BLD AUTO: 9.1 FL (ref 9.4–12.4)
POTASSIUM SERPL-SCNC: 4.5 MMOL/L (ref 3.5–5)
RBC # BLD: 5.13 M/UL (ref 4.7–6.1)
SODIUM BLD-SCNC: 146 MMOL/L (ref 136–145)
TOTAL PROTEIN: 6.9 G/DL (ref 6.6–8.7)
TRIGL SERPL-MCNC: 64 MG/DL (ref 0–149)
TSH SERPL DL<=0.05 MIU/L-ACNC: 2.84 UIU/ML (ref 0.27–4.2)
VITAMIN B-12: 582 PG/ML (ref 211–946)
WBC # BLD: 4.3 K/UL (ref 4.8–10.8)

## 2018-05-08 LAB — VITAMIN D 1,25-DIHYDROXY: 56.3 PG/ML (ref 19.9–79.3)

## 2018-05-09 PROBLEM — M25.529 ELBOW PAIN: Status: ACTIVE | Noted: 2018-05-09

## 2018-05-09 PROBLEM — I51.7 LEFT VENTRICULAR HYPERTROPHY: Status: ACTIVE | Noted: 2018-05-09

## 2018-05-09 PROBLEM — M79.646 PAIN OF FINGER: Status: ACTIVE | Noted: 2018-05-09

## 2018-05-09 PROBLEM — H04.123 DRY EYES: Status: ACTIVE | Noted: 2018-05-09

## 2018-05-09 PROBLEM — M19.90 OSTEOARTHROSIS: Status: ACTIVE | Noted: 2018-05-09

## 2018-05-09 PROBLEM — K25.9 GASTRIC ULCER: Status: ACTIVE | Noted: 2018-05-09

## 2018-05-09 PROBLEM — M72.0 DUPUYTREN'S DISEASE OF PALM: Status: ACTIVE | Noted: 2018-05-09

## 2018-05-09 PROBLEM — G25.81 RESTLESS LEGS SYNDROME: Status: ACTIVE | Noted: 2018-05-09

## 2018-05-09 PROBLEM — I10 HYPERTENSION: Status: ACTIVE | Noted: 2018-05-09

## 2018-05-15 ENCOUNTER — OFFICE VISIT (OUTPATIENT)
Dept: INTERNAL MEDICINE | Age: 63
End: 2018-05-15
Payer: COMMERCIAL

## 2018-05-15 VITALS
WEIGHT: 134 LBS | OXYGEN SATURATION: 98 % | BODY MASS INDEX: 19.23 KG/M2 | DIASTOLIC BLOOD PRESSURE: 80 MMHG | SYSTOLIC BLOOD PRESSURE: 108 MMHG | HEART RATE: 89 BPM

## 2018-05-15 DIAGNOSIS — K52.839 MICROSCOPIC COLITIS, UNSPECIFIED MICROSCOPIC COLITIS TYPE: ICD-10-CM

## 2018-05-15 DIAGNOSIS — M25.50 ARTHRALGIA, UNSPECIFIED JOINT: ICD-10-CM

## 2018-05-15 DIAGNOSIS — Z00.00 ROUTINE ADULT HEALTH MAINTENANCE: Primary | ICD-10-CM

## 2018-05-15 DIAGNOSIS — D51.0 PERNICIOUS ANEMIA: ICD-10-CM

## 2018-05-15 DIAGNOSIS — G47.00 INSOMNIA, UNSPECIFIED TYPE: ICD-10-CM

## 2018-05-15 DIAGNOSIS — F41.9 ANXIETY DISORDER, UNSPECIFIED TYPE: ICD-10-CM

## 2018-05-15 DIAGNOSIS — Z91.09 ENVIRONMENTAL ALLERGIES: ICD-10-CM

## 2018-05-15 PROCEDURE — 99396 PREV VISIT EST AGE 40-64: CPT | Performed by: INTERNAL MEDICINE

## 2018-05-15 RX ORDER — CYANOCOBALAMIN 1000 UG/ML
1000 INJECTION INTRAMUSCULAR; SUBCUTANEOUS ONCE
Qty: 10 ML | Refills: 3 | Status: SHIPPED | OUTPATIENT
Start: 2018-05-15 | End: 2018-05-15 | Stop reason: SDUPTHER

## 2018-05-15 RX ORDER — CYANOCOBALAMIN 1000 UG/ML
1000 INJECTION INTRAMUSCULAR; SUBCUTANEOUS ONCE
Qty: 10 ML | Refills: 3 | Status: SHIPPED | OUTPATIENT
Start: 2018-05-15 | End: 2018-05-15

## 2018-05-15 ASSESSMENT — ENCOUNTER SYMPTOMS
WHEEZING: 0
RECTAL PAIN: 0
CONSTIPATION: 0
BLOOD IN STOOL: 0
EYE PAIN: 0
ANAL BLEEDING: 0
APNEA: 0
BACK PAIN: 0
VOICE CHANGE: 0
TROUBLE SWALLOWING: 0
SINUS PRESSURE: 0
CHOKING: 0
ABDOMINAL PAIN: 0
FACIAL SWELLING: 0
SORE THROAT: 0
ABDOMINAL DISTENTION: 0
RHINORRHEA: 0
EYE DISCHARGE: 0
COUGH: 0
EYE REDNESS: 0
DIARRHEA: 1
NAUSEA: 0
EYE ITCHING: 0
PHOTOPHOBIA: 0
CHEST TIGHTNESS: 0
COLOR CHANGE: 0
SHORTNESS OF BREATH: 0
VOMITING: 0
STRIDOR: 0

## 2018-05-23 ENCOUNTER — ANTI-COAG VISIT (OUTPATIENT)
Dept: INTERNAL MEDICINE | Age: 63
End: 2018-05-23

## 2018-05-23 ENCOUNTER — TELEPHONE (OUTPATIENT)
Dept: INTERNAL MEDICINE | Age: 63
End: 2018-05-23

## 2018-06-04 ENCOUNTER — PATIENT MESSAGE (OUTPATIENT)
Dept: INTERNAL MEDICINE | Age: 63
End: 2018-06-04

## 2018-06-05 RX ORDER — METHYLPREDNISOLONE 4 MG/1
TABLET ORAL
Qty: 21 TABLET | Refills: 0 | Status: SHIPPED | OUTPATIENT
Start: 2018-06-05 | End: 2018-06-11

## 2020-09-21 ENCOUNTER — TREATMENT (OUTPATIENT)
Dept: PHYSICAL THERAPY | Facility: CLINIC | Age: 65
End: 2020-09-21

## 2020-09-21 DIAGNOSIS — G20 PARKINSON'S DISEASE (HCC): Primary | ICD-10-CM

## 2020-09-21 PROCEDURE — 97162 PT EVAL MOD COMPLEX 30 MIN: CPT | Performed by: PHYSICAL THERAPIST

## 2020-09-21 NOTE — PROGRESS NOTES
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 He will be seeing Dr. Rowe in January. Pt states he is taking pregablin (Lyrica). 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 Pt has been with the PD support group since moving to [place]. Pt states he feels like he stays tight in his hips and lower back. He has tried massages and it does help but it is not lasting. He does take several hours of moving around before he is able to really do much. Pt states he can walk 1 mile and then he will start to get tight with muscle spasms. [place] Pt relates he does not balance well. Reports no falls. Mowing his yard is exhausting even though he has a small yard. He does play golf and wishes to continue. He does relate he is starting to notice some hand issues. Tremors are starting.       Precautions and Work Restrictions: retired Pain  No pain reported  Relieving factors: change in position, heat, rest and ice    Social Support  Lives in: one-story house    Treatments  Previous treatment: massage  Patient Goals  Patient goals for therapy: improved balance, decreased pain and increased strength             Objective          Strength/Myotome Testing     Left Hip   Planes of Motion   Flexion: 4  Extension: 3  Abduction: 4-    Right Hip   Planes of Motion   Flexion: 4  Extension: 3  Abduction: 4-    Left Knee   Flexion: 4    Right Knee    Flexion: 4  Extension: 5    Left Ankle/Foot   Dorsiflexion: 5    Right Ankle/Foot   Dorsiflexion: 5    Ambulation     Comments   Normalized gait, however slowed significantly upon pain.         PT Neuro         Assessment & Plan     Assessment  Impairments: abnormal coordination, abnormal gait, activity intolerance, impaired balance, impaired physical strength and safety issue  Assessment details: Patient is a 64 YOM that presents with new onset PD symptoms. Pt was diagnosed several years ago but has had a steady decline in function due to neuromuscular pain. He is currently not on any PD medications and is in the process of switch neurologists, but does not see Dr. Rowe until January 2021. He is interested in pursuing the [facility] program. He will be seen for skilled PT intervention to address functional deficits, pain and global mobility.   Prognosis: fair  Functional Limitations: carrying objects, walking, standing and stooping  Goals  Plan Goals: STG (2 weeks):   1. Patient will report compliance [facility] program.      LTG (4 visits)  1. Patient will be I with [facility] program.    2. Patient to perform TUG within 6 sec without LOB for improved functional mobility.  3. Patient to ambulate 10 meters without AD within 8 sec without LOB for improved gait katrin and functional mobility.  4. Patient to improve FGA score to >/= 29 /30 to decrease client's risk of falls.  5. Patient to improve mini-BEST test balance score to >/= 28 /28 to decrease client's risk of falls.        Plan  Therapy options: will be seen for skilled physical therapy services  Planned modality interventions: TENS  Planned therapy interventions: ADL retraining, balance/weight-bearing training, flexibility, gait training, manual therapy, neuromuscular re-education, motor coordination training, postural training, strengthening, stretching, therapeutic activities, transfer training and home exercise program  Frequency: 2x week  Duration in  visits: 4  Treatment plan discussed with: patient  Plan details: Patient will be seen 2x/wk x 4 wks for the [facility] BIG program. He will also be evaluated by OT.         Timed:  Manual Therapy:    0     mins  22631;  Therapeutic Exercise:    0     mins  29124;     Neuromuscular Cindy:    0    mins  34108;    Therapeutic Activity:     0     mins  02518;     Gait Trainin     mins  76844;     Electrical Stimulation:    0     mins  11575 ( );    Untimed:  Canalith Repositioning    0     mins 08678    Timed Treatment:   0   mins   Total Treatment:     45   mins    PT SIGNATURE: Nilsa aYrbrough, PT, DPT, MSCS, CDP  [place] License #[id number]  DATE TREATMENT INITIATED: 2020    Initial Certification Certification Period: 2020  I certify that the therapy services are furnished while this patient is under my care.  The services outlined above are required by this patient, and will be reviewed every 90 days.     PHYSICIAN: Eder Gonzalez MD      DATE:     Please sign and return via fax to [phone].   Thank you,   [facility].

## 2020-09-30 ENCOUNTER — TRANSCRIBE ORDERS (OUTPATIENT)
Dept: PHYSICAL THERAPY | Facility: CLINIC | Age: 65
End: 2020-09-30

## 2020-09-30 DIAGNOSIS — G20 PARKINSON DISEASE (HCC): Primary | ICD-10-CM

## 2020-10-01 ENCOUNTER — TREATMENT (OUTPATIENT)
Dept: PHYSICAL THERAPY | Facility: CLINIC | Age: 65
End: 2020-10-01

## 2020-10-01 DIAGNOSIS — Z78.9 IMPAIRED MOBILITY AND ADLS: Primary | ICD-10-CM

## 2020-10-01 DIAGNOSIS — R27.8 DECREASED COORDINATION: ICD-10-CM

## 2020-10-01 DIAGNOSIS — Z74.09 IMPAIRED MOBILITY AND ADLS: Primary | ICD-10-CM

## 2020-10-01 PROCEDURE — 97166 OT EVAL MOD COMPLEX 45 MIN: CPT | Performed by: OCCUPATIONAL THERAPIST

## 2020-10-01 PROCEDURE — 97112 NEUROMUSCULAR REEDUCATION: CPT | Performed by: OCCUPATIONAL THERAPIST

## 2020-10-01 NOTE — PROGRESS NOTES
Occupational Therapy Initial Evaluation and Plan of Care      Patient: Yasmin Sagastume   : 1955  Diagnosis/ICD-10 Code:  Impaired mobility and ADLs [Z74.09, Z78.9]  Referring practitioner: Eder Gonzalez MD  Date of Initial Visit: Type: THERAPY  Noted: 10/1/2020  Today's Date: 10/1/2020  Patient seen for 1 sessions      Subjective:     Subjective Questionnaire: 9HPT R: 20.89   L: 18.64  PURDUE PEGBOARD R: 15   L: 15   ROW: 12    ASSEMBLY: 27  Pt with very good demonstration of in-hand manipulation and translation bilaterally     Subjective Evaluation    History of Present Illness  Mechanism of injury: Pt was dx with PD in  and will be seeing Dr. Granados in January. He is currently not taking any meds at this time. Symptoms began with tremoring of bilateral hands, noting especially during texting and becomes with worse w/ stressful or emotional situations. Pt notes that HW has changed significantly with size along with legibility. Pt states writing now looks shaky. In the last 3-4 yrs pt has noted significant decline in weakness. Pt notes tenderness in points of hips and has become very fatigued with all activities. Pt has had some PT in the past but has focused on stretching which pt continues to utilize, andrea in the morning when he feels stiff.   Pt currently notes no difficulty with buttons, zippers or lace tying but becomes shaky with certain foods such as soup, rice, peas, etc and required to eat over bowl.   Pt feels as though his balance has really suffered, although not experiencing any falls, reports many LOB's laterally.   Pt reports that with golfing, he can't seem to always make the muscle memory connection with swinging. Pt is an avid golfer and plays nearly daily with his wife. Pt is still doing ok with teeing up the ball despite mild tremor tasks.   Pt enjoys taking care of his yard but finds that mowing and weed-eating exhaust him significantly.       Patient Occupation: really enjoys  exercises, golfing Hand dominance: right           Objective     OT Neuro         OT Exercises     Row Name 10/01/20 1345             Precautions    Existing Precautions/Restrictions  no known precautions/restrictions  -ST         Exercise 1    Exercise Name 1  OT IE completed per consult  -ST         Exercise 2    Exercise Name 2  wrist/elbow extension stretch; performed on wall and table R UE  -ST         Exercise 3    Exercise Name 3  LSVT BIG! exercises 1&2 standard version  -ST        User Key  (r) = Recorded By, (t) = Taken By, (c) = Cosigned By    Initials Name Provider Type    Margo Vásquez OTR Occupational Therapist           Assessment & Plan     Assessment  Impairments: abnormal coordination, abnormal gait, abnormal muscle firing, abnormal or restricted ROM, activity intolerance, impaired balance, impaired physical strength, lacks appropriate home exercise program and safety issue  Assessment details: Pt presents with deficits related to his PD. Pt exhibits decreased activity tolerance, balance and mild delay in speed/accuracy with R, dominant hand coordination. Pt also with decreased elbow and wrist extension when performing together and affects reaching/pushing and pulling at times. Pt's ADL and IADL tasks affecting by above impairments and would benefit greatly from LSVT BIG! Protocol along w/focused skilled OT services to address strengthening, balance, coordination and ADL/IADL retraining, home/activity/environmental mods.   Prognosis: good  Functional Limitations: walking, moving in bed, standing, stooping, reaching behind back, reaching overhead and unable to perform repetitive tasks  Goals  Plan Goals:   Pt will score 17 seconds or less R HAND on the 9HPT to demonstrate improved accuracy and speed with fine motor coordination by 4 wks.      Pt will be independent assist with LSVT BIG! Program by 4 wks to increase engagement and safety in ADL/IADL tasks.     STGs:    Pt will be independent  assist with hand & RUE stretching HEP to promote increased function and improved comfort level during ADL/IADL tasks by 2 wks.             Plan  Planned therapy interventions: ADL retraining, balance/weight-bearing training, fine motor coordination training, flexibility, functional ROM exercises, home exercise program, motor coordination training, neuromuscular re-education, postural training, strengthening, stretching, therapeutic activities, transfer training and IADL retraining  Frequency: 3x week  Duration in visits: 12  Treatment plan discussed with: patient  Plan details: Est OT POC and goals to reflect above deficits and promote increased independence, safety and satisfaction with daily tasks.         Timed:  Manual Therapy:    0     mins  40104;  Therapeutic Exercise:    0     mins  67807;     Neuromuscular Cindy:    23    mins  89800;    Therapeutic Activity:     0     mins  93769;     Self-Care/ADL     0     mins  91780;   Sensory Int. Tech      0     mins 23108;  Ultrasound:     0     mins  95758;    Electrical Stimulation:    0     mins  59320 ( );    Untimed:  Electrical Stimulation:    0     mins  81750 ( );    Timed Treatment:   23   mins   Total Treatment:     55   mins    OT Signature: Margo Davis MS, OTR/L, CDP  [place] License #: [id number]  DATE TREATMENT INITIATED: 10/1/2020    Initial Certification Certification Period: 12/30/2020  I certify that the therapy services are furnished while this patient is under my care. The services outlined above are required by this patient and will be reviewed every 90 days.     Physician Signature: __________________________________  Eder Gonzalez MD    Please sign and return via fax to [phone]  Thank you,   [facility] Occupational Therapy

## 2020-10-05 ENCOUNTER — TREATMENT (OUTPATIENT)
Dept: PHYSICAL THERAPY | Facility: CLINIC | Age: 65
End: 2020-10-05

## 2020-10-05 DIAGNOSIS — G20 PARKINSON'S DISEASE (HCC): Primary | ICD-10-CM

## 2020-10-05 PROCEDURE — 97112 NEUROMUSCULAR REEDUCATION: CPT | Performed by: PHYSICAL THERAPIST

## 2020-10-05 NOTE — PROGRESS NOTES
Physical Therapy Daily Progress Note  Visit: 2  Date of Initial Visit: Type: THERAPY  Noted: 2020    Patient: Yasmin Sagastume   : 1955  Diagnosis/ICD-10 Code:  Parkinson's disease (CMS/[facility]) [G20]  Referring practitioner: Eder Gonzalez MD  Date of Initial Visit: Type: THERAPY  Noted: 2020  Today's Date: 10/5/2020  Patient seen for 2 sessions      Subjective:   Patient reports: he is ready to get started    Pain: 0/10  Clinical Progress: unchanged  Home Program Compliance: N/A  Treatment has included: therapeutic exercise and neuromuscular re-education    Objective   See Exercise, Manual, and Modality Logs for complete treatment.    PT Neuro  Exercise 1  Exercise Name 1: Bike   Equipment/Resistance 1: L5  Time: 5 min   Exercise 2  Exercise Name 2: LSVT BIG exercises   Sets/Reps 2: 10 ea   Exercise 3  Exercise Name 3: Discussed functional components which will include golf swing and yardwork     Assessment & Plan     Assessment  Assessment details: Patient with good performance of LSVT BIG program. He required cues for LE placement and position with seated exercise, as well as standing trunk rotation. He will be ready to increase challenge with seated flicks at next visit.     Plan  Plan details: Patient to continue with PT services to improve gait, balance, strength, transfers and overall functional mobility.          Timed:  Manual Therapy:            0     mins  80042;  Therapeutic Exercise:    5    mins  47260;     Neuromuscular Cindy:    40    mins  49370;    Therapeutic Activity:      0     mins  86603;     Gait Trainin    mins  22114;     Electrical Stimulation:    0    mins  61473 ( );     Untimed:  Canalith Repositioning techniques _0_ 54812      Timed Treatment:   45   mins   Total Treatment:     45   mins      Nilsa Yarbrough, PT, DPT, MSCS, CDP  [place] License #: [id number]  Physical Therapist

## 2020-10-06 ENCOUNTER — TREATMENT (OUTPATIENT)
Dept: PHYSICAL THERAPY | Facility: CLINIC | Age: 65
End: 2020-10-06

## 2020-10-06 DIAGNOSIS — Z74.09 IMPAIRED MOBILITY AND ADLS: Primary | ICD-10-CM

## 2020-10-06 DIAGNOSIS — Z78.9 IMPAIRED MOBILITY AND ADLS: Primary | ICD-10-CM

## 2020-10-06 DIAGNOSIS — R27.8 DECREASED COORDINATION: ICD-10-CM

## 2020-10-06 PROCEDURE — 97112 NEUROMUSCULAR REEDUCATION: CPT | Performed by: OCCUPATIONAL THERAPIST

## 2020-10-06 PROCEDURE — 97530 THERAPEUTIC ACTIVITIES: CPT | Performed by: OCCUPATIONAL THERAPIST

## 2020-10-06 PROCEDURE — 97535 SELF CARE MNGMENT TRAINING: CPT | Performed by: OCCUPATIONAL THERAPIST

## 2020-10-06 NOTE — PROGRESS NOTES
"Occupational Therapy Daily Progress Note  Visit: 2  Date of Initial Visit: Type: THERAPY  Noted: 10/1/2020      Patient: Yasmin Sagastume   : 1955  Diagnosis/ICD-10 Code:  Impaired mobility and ADLs [Z74.09, Z78.9]  Referring practitioner: Eder Gonzalez MD  Date of Initial Visit: Type: THERAPY  Noted: 10/1/2020  Today's Date: 10/6/2020  Patient seen for 2 sessions      Subjective:   Patient reports: \"I can tell these exercises are already helping me!\"  Pain: 0/10  Subjective Questionnaire: n/a  Clinical Progress: improved  Home Program Compliance: Yes  Treatment has included: therapeutic exercise, neuromuscular re-education and therapeutic activity    Subjective   Objective     OT Neuro         OT Exercises     Row Name 10/06/20 1017             Exercise 1    Exercise Name 1  neural priming w/focus on UE/LE f/e for carry over with fxnl mobility arm swing and movement; L 6  -ST      Time (Minutes) 1  6  -ST         Exercise 2    Exercise Name 2  completed of standing version of LSVT BIG! with addition of BUE flicks during exercises 1&2. Pt with good voice projection during all counting and no instances LOB during standing exercises. Cuing for elbow/wrist extension during exercise 5.   -ST         Exercise 3    Exercise Name 3  fxnl activity of golf swing  -ST      Reps 3  5  -ST         Exercise 4    Exercise Name 4  standing on air ex scooping rice/varying items w/spork and placing in cup at varying hts/positions for controled targeting/accuracy of movement; simulated eating  -ST        User Key  (r) = Recorded By, (t) = Taken By, (c) = Cosigned By    Initials Name Provider Type    Margo Vásquez OTR Occupational Therapist           Assessment & Plan     Assessment  Assessment details: Completed full standard version of LSVT BIG! Exercises this session w/additional of BUE flicks w/exercises 1&2. Pt only requiring min cuing for improving hip extension with exercise 2 and elbow/wrist extension " with exercise 5. Pt demonstrates excellent carry over of demo and voice projection with counting aloud. During fxnl tasks of swinging golf club, pt demonstrated improved trunk rotation and toe pivot after BIG exercises. Pt performed good control w/steadiness of hand/arm when picking up scoop of rice or other items from bin and placing in cup at varying hts/positions. Encouraged pt to complete similar activity at home for carry over.     Plan  Plan details: Continue w/OT POC and goals as previously est. Pt ready to progress to standing on dynamic surfaces and stepping over 1/2 foam rolls forward/laterally for increased difficulty.         Visit Diagnoses:    ICD-10-CM ICD-9-CM   1. Impaired mobility and ADLs  Z74.09 V49.89    Z78.9    2. Decreased coordination  R27.8 781.3             Timed:  Manual Therapy:    0     mins  41243;  Therapeutic Exercise:    0     mins  92400;     Neuromuscular Cindy:    20    mins  94344;    Therapeutic Activity:     15     mins  93430;     Self-Care/ADL     8     mins  73356;   Sensory Int. Tech      0     mins 47081;  Ultrasound:     0     mins  23539;    Electrical Stimulation:    0     mins  77026 ( );    Untimed:  Electrical Stimulation:    0     mins  41455 ( );    Timed Treatment:   43   mins   Total Treatment:     43   mins    OT Signature: Margo Davis MS, OTR/L, CDP  [place] License #: [id number]

## 2020-10-08 ENCOUNTER — TREATMENT (OUTPATIENT)
Dept: PHYSICAL THERAPY | Facility: CLINIC | Age: 65
End: 2020-10-08

## 2020-10-08 DIAGNOSIS — G20 PARKINSON'S DISEASE (HCC): Primary | ICD-10-CM

## 2020-10-08 PROCEDURE — 97112 NEUROMUSCULAR REEDUCATION: CPT | Performed by: PHYSICAL THERAPIST

## 2020-10-08 NOTE — PROGRESS NOTES
"Physical Therapy Daily Progress Note  Visit: 3  Date of Initial Visit: Type: THERAPY  Noted: 2020    Patient: Yasmin Sagastume   : 1955  Diagnosis/ICD-10 Code:  Parkinson's disease (CMS/[facility]) [G20]  Referring practitioner: Eder Gonzalez MD  Date of Initial Visit: Type: THERAPY  Noted: 2020  Today's Date: 10/8/2020  Patient seen for 3 sessions      Subjective:   Patient reports: He has a MD appt on Wednesday.   Pain: 0/10  Clinical Progress: improved  Home Program Compliance: Yes  Treatment has included: neuromuscular re-education    Objective   See Exercise, Manual, and Modality Logs for complete treatment.    PT Neuro   Exercise 1  Exercise Name 1: Bike   Equipment/Resistance 1: L5  Time: 5 min   Exercise 2  Exercise Name 2: LSVT BIG exercises   Equipment/Resistance 2: added flicks to exs #1-2, stepped over HFR exs #3-5   Sets/Reps 2: 10 ea   Exercise 3  Exercise Name 3: sit to stands from RB   Sets/Reps 3: 10  Exercise 4  Exercise Name 4: kneeling onto airex with 5# trunk rotation \"digs\"   Sets/Reps 4: 10 ea side   Exercise 5  Exercise Name 5: BIG! walking on TM   Equipment/Resistance 5: 3.5 mph   Time 5: 4 min     Assessment & Plan     Assessment  Assessment details: Patient demonstrates good performance of all exercises. Flicks and stepping over half foam roll were all added with little LOB. He will continue to be progressed as indicated.     Plan  Plan details: Patient to continue with PT services to improve gait, balance, strength, transfers and overall functional mobility.          Timed:  Manual Therapy:            0     mins  05071;  Therapeutic Exercise:    5    mins  33012;     Neuromuscular Cindy:    40    mins  42017;    Therapeutic Activity:      0     mins  42857;     Gait Trainin    mins  01664;     Electrical Stimulation:    0    mins  49840 ( );     Untimed:  Canalith Repositioning techniques _0_ [id number]      Timed Treatment:   45   mins   Total " Treatment:     45   mins      Nilsa Yarbrough, PT, DPT, MSCS, CDP  [place] License #: [id number]  Physical Therapist

## 2020-10-12 ENCOUNTER — TREATMENT (OUTPATIENT)
Dept: PHYSICAL THERAPY | Facility: CLINIC | Age: 65
End: 2020-10-12

## 2020-10-12 DIAGNOSIS — Z78.9 IMPAIRED MOBILITY AND ADLS: ICD-10-CM

## 2020-10-12 DIAGNOSIS — Z74.09 IMPAIRED MOBILITY AND ADLS: ICD-10-CM

## 2020-10-12 DIAGNOSIS — G20 PARKINSON'S DISEASE (HCC): Primary | ICD-10-CM

## 2020-10-12 PROCEDURE — 97112 NEUROMUSCULAR REEDUCATION: CPT | Performed by: PHYSICAL THERAPIST

## 2020-10-12 NOTE — PROGRESS NOTES
Physical Therapy Daily Progress Note  Visit: 4  Date of Initial Visit: Type: THERAPY  Noted: 2020    Patient: Yasmin Sagastume   : 1955  Diagnosis/ICD-10 Code:  Parkinson's disease (CMS/[facility]) [G20]  Referring practitioner: Eder Gonzalez MD  Date of Initial Visit: Type: THERAPY  Noted: 2020  Today's Date: 10/12/2020  Patient seen for 4 sessions      Subjective:   Patient reports: he played golf and had no pain.   Pain: 0/10  Clinical Progress: improved  Home Program Compliance: Yes  Treatment has included: neuromuscular re-education    Objective   See Exercise, Manual, and Modality Logs for complete treatment.    PT Neuro   Exercise 1  Exercise Name 1: Bike   Equipment/Resistance 1: L5  Time: 5 min   Exercise 2  Exercise Name 2: LSVT BIG exercises   Equipment/Resistance 2: flicks and counting aloud #1-2; alternating stepping over HFR #3-5, TD's added to #6-7  Sets/Reps 2: 10 ea   Exercise 3  Exercise Name 3: sit to stands from RB   Sets/Reps 3: 10  Exercise 4  Exercise Name 4: kneeling on airex with x5 lifts followed by pushing in to SL and back to neutral position   Sets/Reps 4: 10 ea side   Exercise 5  Exercise Name 5: BIG! walking on TM   Equipment/Resistance 5: 3.0-4.0   Time 5: 4 min   Exercise 6  Exercise Name 6: SLS while picking beans out of rice with tweezers   Sets/Reps 6: 10 beans each hand   Exercise 7  Exercise Name 7: golf club swinging   Sets/Reps 7: 10      Assessment & Plan     Assessment  Assessment details: Patient performed all exercises well, he did demo a decrease in flicking speed when he was instructed to count aloud. Overall, balance was proficient during all functional tasks. Progress as indicated.     Plan  Plan details: Patient to continue with PT services to improve gait, balance, strength, transfers and overall functional mobility.          Timed:  Manual Therapy:            0     mins  76349;  Therapeutic Exercise:    5    mins  46902;     Neuromuscular Cindy:     40    mins  30963;    Therapeutic Activity:      0     mins  68417;     Gait Trainin    mins  10575;     Electrical Stimulation:    0    mins  99624 ( );     Untimed:  Canalith Repositioning techniques _0_ 22012      Timed Treatment:   45   mins   Total Treatment:     45   mins      Nilsa Yarbrough PT, DPT, MSCS, CDP  [place] License #: [id number]  Physical Therapist

## 2020-10-13 ENCOUNTER — TREATMENT (OUTPATIENT)
Dept: PHYSICAL THERAPY | Facility: CLINIC | Age: 65
End: 2020-10-13

## 2020-10-13 DIAGNOSIS — R27.8 DECREASED COORDINATION: ICD-10-CM

## 2020-10-13 DIAGNOSIS — G20 PARKINSON'S DISEASE (HCC): Primary | ICD-10-CM

## 2020-10-13 DIAGNOSIS — Z74.09 IMPAIRED MOBILITY AND ADLS: ICD-10-CM

## 2020-10-13 DIAGNOSIS — Z78.9 IMPAIRED MOBILITY AND ADLS: ICD-10-CM

## 2020-10-13 PROCEDURE — 97112 NEUROMUSCULAR REEDUCATION: CPT | Performed by: OCCUPATIONAL THERAPIST

## 2020-10-13 PROCEDURE — 97530 THERAPEUTIC ACTIVITIES: CPT | Performed by: OCCUPATIONAL THERAPIST

## 2020-10-13 NOTE — PROGRESS NOTES
"Occupational Therapy Daily Progress Note  Visit: 3  Date of Initial Visit: Type: THERAPY  Noted: 10/1/2020      Patient: Yasmin Sagastume   : 1955  Diagnosis/ICD-10 Code:  Parkinson's disease (CMS/[facility]) [G20]  Referring practitioner: Eder Gonzalez MD  Date of Initial Visit: Type: THERAPY  Noted: 10/1/2020  Today's Date: 10/14/2020  Patient seen for 3 sessions      Subjective:   Patient reports: \"I'm finding that the exercises are helping my golf game tremendously! Especially the flexibility\"  Pain: 0/10  Subjective Questionnaire: n/a  Clinical Progress: improved  Home Program Compliance: Yes  Treatment has included: neuromuscular re-education and therapeutic activity    Subjective   Objective     OT Neuro         OT Exercises     Row Name 10/13/20 1015             Precautions    Existing Precautions/Restrictions  no known precautions/restrictions  -ST         Exercise 1    Exercise Name 1  neural [id number] with strengthening and activity tolerance building; 4 mins NuStep L 6, elliptical 4 mins  -ST         Exercise 2    Exercise Name 2  completed standard version of LSVT BIG! with addition of BUE flicks during exercises 1&2 along with 1.5 wrist wts. Pt with good voice projection during all counting and no instances LOB during standing exercises. Addition of stepping over 1/2 foam rolls exercises 3-5 w/ALT R/L and standing on foam discs for exercises 6&7.   -ST      Reps 2  10  -ST         Exercise 3    Exercise Name 3  STS from mat table and air ex using 3# therabar to lift OH when standing for increased power; open digits  -ST      Reps 3  10  -ST         Exercise 4    Exercise Name 4  fxnl FMC/UE coordination task-walking forward/backward and laterally along foam balance bridge w/full cup of water/alternating R/L hands  -ST         Exercise 5    Exercise Name 5  tall kneeling on Bosu, ALT R/L knees while performing rowing to R/L using 3# therabar  -ST      Reps 5  10  -ST         Exercise 6    Exercise " Name 6  standing on dome portion of Bosu holding full cup of water; intermittent CGA for stability/balance   -ST        User Key  (r) = Recorded By, (t) = Taken By, (c) = Cosigned By    Initials Name Provider Type    Margo Vásquez OTR Occupational Therapist           Assessment & Plan     Assessment  Assessment details: Increased complexity of LSVT BIG exercises to include 1.5# wrist wt's bilaterally during flicks exercises 1&2 along with stepping over 1/2 foam rolls exercises 3-5 with ALT R/L and standing on foam discs 6&7.  Increased difficulty of FMC activity to include stepping heel to toe and laterally R/L holding full cup of water, ALT R/L hands  ALT R/L kneeling on Bosu with rowing motion 3# therabar ALT movement to R/L  STS with increased difficulty including OH press 3# therabar with air ex for carry over at home with outdoor gardening and home renovation tasks. Pt making excellent gains and completing HEPs as prescribed. Will continue to progress all tasks as needed to meet pt needs and continue to improve flexibility, ROM, coordination, balance, activity tolerance and independence/satisfaction with daily tasks.     Plan  Plan details: Continue w/OT POC and goals        Visit Diagnoses:    ICD-10-CM ICD-9-CM   1. Parkinson's disease (CMS/[facility])  G20 332.0   2. Impaired mobility and ADLs  Z74.09 V49.89    Z78.9    3. Decreased coordination  R27.8 781.3             Timed:  Manual Therapy:    0     mins  47621;  Therapeutic Exercise:    0     mins  62532;     Neuromuscular Cindy:    35    mins  86111;    Therapeutic Activity:     10     mins  96080;     Self-Care/ADL     0     mins  27655;   Sensory Int. Tech      0     mins 65597;  Ultrasound:     0     mins  31114;    Electrical Stimulation:    0     mins  23436 ( );    Untimed:  Electrical Stimulation:    0     mins  72978 ( );    Timed Treatment:   45   mins   Total Treatment:     45   mins    OT Signature: Margo Davis MS, OTR/L,  RACHEAL  [place] License #: [id number]

## 2020-10-15 ENCOUNTER — TREATMENT (OUTPATIENT)
Dept: PHYSICAL THERAPY | Facility: CLINIC | Age: 65
End: 2020-10-15

## 2020-10-15 DIAGNOSIS — R27.8 DECREASED COORDINATION: ICD-10-CM

## 2020-10-15 DIAGNOSIS — Z74.09 IMPAIRED MOBILITY AND ADLS: Primary | ICD-10-CM

## 2020-10-15 DIAGNOSIS — Z78.9 IMPAIRED MOBILITY AND ADLS: Primary | ICD-10-CM

## 2020-10-15 PROCEDURE — 97530 THERAPEUTIC ACTIVITIES: CPT | Performed by: OCCUPATIONAL THERAPIST

## 2020-10-15 PROCEDURE — 97112 NEUROMUSCULAR REEDUCATION: CPT | Performed by: OCCUPATIONAL THERAPIST

## 2020-10-15 NOTE — PROGRESS NOTES
"Occupational Therapy Daily Progress Note  Visit: 4  Date of Initial Visit: Type: THERAPY  Noted: 10/1/2020      Patient: Yasmin Sagastume   : 1955  Diagnosis/ICD-10 Code:  Impaired mobility and ADLs [Z74.09, Z78.9]  Referring practitioner: Eder Gonzalez MD  Date of Initial Visit: Type: THERAPY  Noted: 10/1/2020  Today's Date: 10/15/2020  Patient seen for 4 sessions      Subjective:   Patient reports: \"I'm very tired from my appt yesterday in [place]. We were gone from 7 to 7 but I did my exercises.\"  Pain: 0/10  Subjective Questionnaire: n/a  Clinical Progress: improved  Home Program Compliance: Yes  Treatment has included: neuromuscular re-education and therapeutic activity    Subjective   Objective     OT Neuro         OT Exercises     Row Name 10/15/20 1010             Precautions    Existing Precautions/Restrictions  no known precautions/restrictions  -ST         Exercise 1    Exercise Name 1  neural priming with strengthening and building activity tolerance; elliptical 2 mins backward, 4 mins forward   -ST      Time (Minutes) 1  6  -ST         Exercise 2    Exercise Name 2  completed standard version of LSVT BIG! with addition of BUE flicks during exercises 1&2 along with 1.5 wrist wts. Pt with good voice projection during all counting and no instances LOB during standing exercises. Addition of stepping over 1/2 foam rolls exercises 3-5 w/ALT R/L and standing on foam discs for exercises 6&7.   -ST      Reps 2  10  -ST         Exercise 3    Exercise Name 3  STS from mat table and dome portion of 1/2 foam roll with UE OH press using 4# therabar   -ST      Reps 3  10  -ST         Exercise 4    Exercise Name 4  fxnl FMC/UE coordination task standing on 1/2 foam roll performing shoulder f/e and shoulder ab/adduction ALT R/L hand w/full cup of water   -ST         Exercise 5    Exercise Name 5  standing on flat portion of Bosu performing rowing laterally to R/L using 4# therabar; intermittent CGA for " stability   -ST         Exercise 6    Exercise Name 6  push-ups from flat portion of Bosu EOM   -ST      Sets 6  2  -ST      Reps 6  5  -ST         Exercise 7    Exercise Name 7  one-handed knot tying w/ ALT R/L hands   -ST         Exercise 8    Exercise Name 8  one-handed nut/bolt placement and removal ALT R/L hands   -ST        User Key  (r) = Recorded By, (t) = Taken By, (c) = Cosigned By    Initials Name Provider Type    Margo Vásquez OTR Occupational Therapist           Assessment & Plan     Assessment  Assessment details: Progressed with FMC tasks by including one handed translation/in-hand manipulation tasks. Pt very fatigued from long day traveling out of state to MD appt and requiring more rest breaks than normal. Progressed difficulty of standing balance tasks to include STS from 1/2 foam roll and standing on flat portion of Bosu. Will continue to increase difficulty and complexity of tasks to meet pt needs and increase overall independence, engagement and satisfaction w/daily tasks.     Plan  Plan details: Continue w/OT POC and goals as previously est.         Visit Diagnoses:    ICD-10-CM ICD-9-CM   1. Impaired mobility and ADLs  Z74.09 V49.89    Z78.9    2. Decreased coordination  R27.8 781.3             Timed:  Manual Therapy:    0     mins  21543;  Therapeutic Exercise:    0     mins  25573;     Neuromuscular Cindy:    31    mins  58281;    Therapeutic Activity:     14     mins  62916;     Self-Care/ADL     0     mins  17245;   Sensory Int. Tech      0     mins 41682;  Ultrasound:     0     mins  54086;    Electrical Stimulation:    0     mins  31540 ( );    Untimed:  Electrical Stimulation:    0     mins  97808 ( );    Timed Treatment:   45   mins   Total Treatment:     45   mins    OT Signature: Margo Davis, MS, OTR/L, CDP  [place] License #: [id number]

## 2020-10-19 ENCOUNTER — TREATMENT (OUTPATIENT)
Dept: PHYSICAL THERAPY | Facility: CLINIC | Age: 65
End: 2020-10-19

## 2020-10-19 DIAGNOSIS — G20 PARKINSON'S DISEASE (HCC): Primary | ICD-10-CM

## 2020-10-19 PROCEDURE — 97112 NEUROMUSCULAR REEDUCATION: CPT | Performed by: PHYSICAL THERAPIST

## 2020-10-19 NOTE — PROGRESS NOTES
Physical Therapy Daily Progress Note  Visit: 5  Date of Initial Visit: Type: THERAPY  Noted: 2020    Patient: Yasmin Sagastume   : 1955  Diagnosis/ICD-10 Code:  Parkinson's disease (CMS/[facility]) [G20]  Referring practitioner: Eder Gonzalez MD  Date of Initial Visit: Type: THERAPY  Noted: 2020  Today's Date: 10/19/2020  Patient seen for 5 sessions      Subjective:   Patient reports: his legs are recovered from last week.   Pain: 0/10  Clinical Progress: improved  Home Program Compliance: Yes  Treatment has included: therapeutic exercise and neuromuscular re-education    Objective   See Exercise, Manual, and Modality Logs for complete treatment.    PT Neuro   Exercise 1  Exercise Name 1: Elliptical  Equipment/Resistance 1: L3  Time: 5 min   Exercise 2  Exercise Name 2: LSVT BIG exercises   Equipment/Resistance 2: flicks and counting aloud #1-2 w/ 1# wrist weight; alternating stepping over HFR #3-5, TD's added to #6-7 w/ wrist weight   Sets/Reps 2: 10 ea   Exercise 3  Exercise Name 3: sit to stands from RB w/ 4# bar overhead   Sets/Reps 3: 10  Exercise 4  Exercise Name 4: BIG! walking on TM   Equipment/Resistance 4: 0.8-1.6 mph   Sets/Reps 4: side, forward and backward walking   Time 4: 6 min                    Assessment & Plan     Assessment  Assessment details: Patient with increased challenge by using wrist weights for exs #12 and 7-8. Dynamic balance while on TM with lateral and posterior stepping while walking. This was challenging, however he improved with practice and speed was increased.     Plan  Plan details: Patient to continue with PT services to improve gait, balance, strength, transfers and overall functional mobility.          Timed:  Manual Therapy:            0     mins  00779;  Therapeutic Exercise:    6    mins  31980;     Neuromuscular Cindy:    39    mins  79119;    Therapeutic Activity:      0     mins  67150;     Gait Trainin    mins  62502;     Electrical  Stimulation:    0    mins  16592 ( );     Untimed:  Canalith Repositioning techniques _0_ 19158      Timed Treatment:   45   mins   Total Treatment:     45   mins      Nilsa Yarbrough PT, ISABELT, MSCS, CDP  [place] License #: [id number]  Physical Therapist

## 2020-10-20 ENCOUNTER — TREATMENT (OUTPATIENT)
Dept: PHYSICAL THERAPY | Facility: CLINIC | Age: 65
End: 2020-10-20

## 2020-10-20 DIAGNOSIS — Z74.09 IMPAIRED MOBILITY AND ADLS: Primary | ICD-10-CM

## 2020-10-20 DIAGNOSIS — Z78.9 IMPAIRED MOBILITY AND ADLS: Primary | ICD-10-CM

## 2020-10-20 DIAGNOSIS — R27.8 DECREASED COORDINATION: ICD-10-CM

## 2020-10-20 PROCEDURE — 97112 NEUROMUSCULAR REEDUCATION: CPT | Performed by: OCCUPATIONAL THERAPIST

## 2020-10-20 PROCEDURE — 97530 THERAPEUTIC ACTIVITIES: CPT | Performed by: OCCUPATIONAL THERAPIST

## 2020-10-20 NOTE — PROGRESS NOTES
"Occupational Therapy Daily Progress Note  Visit: 5  Date of Initial Visit: Type: THERAPY  Noted: 10/1/2020      Patient: Yasmin Sagastume   : 1955  Diagnosis/ICD-10 Code:  Impaired mobility and ADLs [Z74.09, Z78.9]  Referring practitioner: Eder Gonzalez MD  Date of Initial Visit: Type: THERAPY  Noted: 10/1/2020  Today's Date: 10/20/2020  Patient seen for 5 sessions      Subjective:   Patient reports: \"I feel like my back almost was going out yesterday evening. It was the L side of my back/hip. Maybe similar to a sciatica\"  Pain: 0/10  Subjective Questionnaire: n/a  Clinical Progress: improved  Home Program Compliance: Yes  Treatment has included: neuromuscular re-education and therapeutic activity    Subjective   Objective     OT Neuro         OT Exercises     Row Name 10/20/20 1015             Precautions    Existing Precautions/Restrictions  no known precautions/restrictions  -ST         Exercise 1    Exercise Name 1  deferred elliptical d/t \"back feeling as though it would give out\"; NuStep L 8   -ST      Time (Minutes) 1  6  -ST         Exercise 2    Exercise Name 2  completed standard version of LSVT BIG! with addition of BUE flicks during exercises 1&2 along with 1.5 wrist wts. Pt with good voice projection during all counting and no instances LOB during standing exercises. Addition of stepping over 2 1/2 foam rolls exercises 3-5 w/ALT R/L & 1.5# wrist wts and standing on foam disc/Bosu for exercise 6 and foam discs for exercise 7.   -ST      Reps 2  10  -ST         Exercise 3    Exercise Name 3  STS from flat portion of Bosu while holding full cups of water in B hands  -ST      Reps 3  5  -ST         Exercise 4    Exercise Name 4  holding 4# med ball on fingertips and performing shoulder abd/adduction, flexion, ALT R/L for strengthening and steadiness & control of UE movement   -ST        User Key  (r) = Recorded By, (t) = Taken By, (c) = Cosigned By    Initials Name Provider Type    ST Thurmond, " Margo CAMACHO OTR Occupational Therapist           Assessment & Plan     Assessment  Assessment details: Deferred elliptical this date d/t pt having symptoms of lower back weakness/discomfort from last session which extended into L hip. PT to assess next session to further determine route cause. Did not re-create any of complaint symptoms during session and pt reported feeling very good with increased flexibility. Increased difficulty of exercise 6 by adding varying dynamic surfaces; Bosu/foam disc, pt with increased difficulty with WS'ing however quickly accommodated to challenge. Pt reports golf swing is better than it has been in yrs and has finished his outdoor project w/o any difficulty. Increased complexity of fxnl UE stability task by having pt perform STS transfers and standing on flat portion of Bosu while holding full cups of water in B hands. Pt w/excellent stability in UE movement w/no tremor noted.    Plan  Plan details: Continue w/POC and goals as est to meet pt needs, outcomes and goals for improving overall independence, satisfaction and QOL.         Visit Diagnoses:    ICD-10-CM ICD-9-CM   1. Impaired mobility and ADLs  Z74.09 V49.89    Z78.9    2. Decreased coordination  R27.8 781.3             Timed:  Manual Therapy:    0     mins  73843;  Therapeutic Exercise:    0     mins  01568;     Neuromuscular Cindy:    30    mins  66597;    Therapeutic Activity:     15     mins  76143;     Self-Care/ADL     0     mins  68502;   Sensory Int. Tech      0     mins 13381;  Ultrasound:     0     mins  50146;    Electrical Stimulation:    0     mins  80824 ( );    Untimed:  Electrical Stimulation:    0     mins  25506 ( );    Timed Treatment:   45   mins   Total Treatment:     45   mins    OT Signature: Margo Davis MS, ELANR/L, RACHEAL  [place] License #: [id number]

## 2020-10-21 ENCOUNTER — TREATMENT (OUTPATIENT)
Dept: PHYSICAL THERAPY | Facility: CLINIC | Age: 65
End: 2020-10-21

## 2020-10-21 DIAGNOSIS — G20 PARKINSON'S DISEASE (HCC): Primary | ICD-10-CM

## 2020-10-21 PROCEDURE — 97112 NEUROMUSCULAR REEDUCATION: CPT | Performed by: PHYSICAL THERAPIST

## 2020-10-21 PROCEDURE — 97110 THERAPEUTIC EXERCISES: CPT | Performed by: PHYSICAL THERAPIST

## 2020-10-21 NOTE — PROGRESS NOTES
Physical Therapy Daily Progress Note  Visit: 6  Date of Initial Visit: Type: THERAPY  Noted: 2020    Patient: Yasmin Sagastume   : 1955  Diagnosis/ICD-10 Code:  Parkinson's disease (CMS/[facility]) [G20]  Referring practitioner: Eder Gonzalez MD  Date of Initial Visit: Type: THERAPY  Noted: 2020  Today's Date: 10/21/2020  Patient seen for 6 sessions      Subjective:   Patient reports: he had low back tightness when he used the elliptical.   Pain: 0/10  Clinical Progress: improved  Home Program Compliance: Yes  Treatment has included: therapeutic exercise and neuromuscular re-education    Objective   See Exercise, Manual, and Modality Logs for complete treatment.    PT Neuro   Exercise 1  Exercise Name 1: Elliptical  Equipment/Resistance 1: L1  Time: 1 min fwrd/1min back   Exercise 2  Exercise Name 2: LSVT BIG exercises   Equipment/Resistance 2: flicks and counting aloud #1-2 w/ 1# wrist weight; alternating stepping onto BOSU #3-5, TD's added to #6-7 w/ wrist weight   Sets/Reps 2: 10 ea   Exercise 3  Exercise Name 3: sit to stands from RB w/ 4# bar overhead   Sets/Reps 3: 10  Exercise 4  Exercise Name 4: BIG! walking on TM   Equipment/Resistance 4: 0.8-1.6 mph   Sets/Reps 4: side, forward and backward walking   Time 4: 6 min   Exercise 5  Exercise Name 5: dario pose and threading   Time 5: 3 min       Assessment & Plan     Assessment  Assessment details: Patient with slight increase in LBP and tightness following elliptical. Instead of moving forward, he was instructed to move backward, which did not cause any activation of glute muscles. This caused no pain. Patient was also administered stretches to perform to improve LB tightness.     Plan  Plan details: Patient to continue with PT services to improve gait, balance, strength, transfers and overall functional mobility.          Timed:  Manual Therapy:            0     mins  40454;  Therapeutic Exercise:    8    mins  36658;     Neuromuscular Cindy:     37    mins  05071;    Therapeutic Activity:      0     mins  36047;     Gait Trainin    mins  06862;     Electrical Stimulation:    0    mins  44665 ( );     Untimed:  Canalith Repositioning techniques _0_ 05828      Timed Treatment:   45   mins   Total Treatment:     45   mins      Nilsa Yarbrough PT, DPT, MSCS, CDP  [place] License #: [id number]  Physical Therapist

## 2020-10-22 ENCOUNTER — TREATMENT (OUTPATIENT)
Dept: PHYSICAL THERAPY | Facility: CLINIC | Age: 65
End: 2020-10-22

## 2020-10-22 DIAGNOSIS — Z74.09 IMPAIRED MOBILITY AND ADLS: Primary | ICD-10-CM

## 2020-10-22 DIAGNOSIS — Z78.9 IMPAIRED MOBILITY AND ADLS: Primary | ICD-10-CM

## 2020-10-22 DIAGNOSIS — R27.8 DECREASED COORDINATION: ICD-10-CM

## 2020-10-22 PROCEDURE — 97112 NEUROMUSCULAR REEDUCATION: CPT | Performed by: OCCUPATIONAL THERAPIST

## 2020-10-22 PROCEDURE — 97530 THERAPEUTIC ACTIVITIES: CPT | Performed by: OCCUPATIONAL THERAPIST

## 2020-10-22 NOTE — PROGRESS NOTES
"Occupational Therapy Daily Progress Note  Visit: 6  Date of Initial Visit: Type: THERAPY  Noted: 10/1/2020      Patient: Yasmin Sagastume   : 1955  Diagnosis/ICD-10 Code:  Impaired mobility and ADLs [Z74.09, Z78.9]  Referring practitioner: Eder Gonzalez MD  Date of Initial Visit: Type: THERAPY  Noted: 10/1/2020  Today's Date: 10/25/2020  Patient seen for 6 sessions      Subjective:   Patient reports: \"I feel great\"  Pain: 0/10  Subjective Questionnaire: n/a  Clinical Progress: improved  Home Program Compliance: Yes  Treatment has included: neuromuscular re-education and therapeutic activity    Subjective   Objective     OT Neuro              Assessment & Plan     Assessment  Assessment details: Pt continues to improve with increasingly difficult tasks including tall kneeling on unstable surface and incorporating core stability tasks to carry over into home and community settings for balance and righting reactions/reaction timing. Pt demonstrates significant improvement in UE steadiness & coordination. Will continue to progress difficulty & complexity of tasks to meet pt needs.     Plan  Plan details: Continue w/OT POC and goals as est.         Visit Diagnoses:    ICD-10-CM ICD-9-CM   1. Impaired mobility and ADLs  Z74.09 V49.89    Z78.9    2. Decreased coordination  R27.8 781.3             Timed:  Manual Therapy:    0     mins  20606;  Therapeutic Exercise:    0     mins  34089;     Neuromuscular Cindy:    35    mins  24705;    Therapeutic Activity:     10     mins  82015;     Self-Care/ADL     0     mins  82037;   Sensory Int. Tech      0     mins 88257;  Ultrasound:     0     mins  30616;    Electrical Stimulation:    0     mins  58700 ( );    Untimed:  Electrical Stimulation:    0     mins  28443 ( );    Timed Treatment:   45   mins   Total Treatment:     45   mins    OT Signature: Margo Davis MS, OTR/L, CDP  [place] License #: [id number]    "

## 2020-10-26 ENCOUNTER — TREATMENT (OUTPATIENT)
Dept: PHYSICAL THERAPY | Facility: CLINIC | Age: 65
End: 2020-10-26

## 2020-10-26 DIAGNOSIS — G20 PARKINSON'S DISEASE (HCC): Primary | ICD-10-CM

## 2020-10-26 PROCEDURE — 97112 NEUROMUSCULAR REEDUCATION: CPT | Performed by: PHYSICAL THERAPIST

## 2020-10-26 NOTE — PROGRESS NOTES
Physical Therapy Daily Progress Note  Visit: 7  Date of Initial Visit: Type: THERAPY  Noted: 2020    Patient: Yasmin Sagastume   : 1955  Diagnosis/ICD-10 Code:  Parkinson's disease (CMS/[facility]) [G20]  Referring practitioner: Eder Gonzalez MD  Date of Initial Visit: Type: THERAPY  Noted: 2020  Today's Date: 10/26/2020  Patient seen for 7 sessions      Subjective:   Patient reports: he was able to play golf last Thursday.   Pain: 0/10  Clinical Progress: improved  Home Program Compliance: Yes  Treatment has included: neuromuscular re-education    Objective   See Exercise, Manual, and Modality Logs for complete treatment.    PT Neuro   Exercise 1  Exercise Name 1: Elliptical  Equipment/Resistance 1: L1  Time: 3 min back/2 min forward   Exercise 2  Exercise Name 2: LSVT BIG exercises   Equipment/Resistance 2: flicks and counting aloud #1-2 w/ 1# wrist weight; alternating stepping onto BOSU from airex #3-5, TD's added to #6-7 w/ wrist weight   Sets/Reps 2: 10 ea   Exercise 3  Exercise Name 3: sit to stands from RB w/ 4# bar overhead   Sets/Reps 3: 10  Exercise 4  Exercise Name 4: BIG! walking on TM   Equipment/Resistance 4: 1.2  Sets/Reps 4: side, forward and backward walking   Time 4: 6 min   Exercise 5  Exercise Name 5: SL and DL standing on BOSU - round side   Time 5: 3 min   Exercise 6  Exercise Name 6: flat side - moving CW and CCW   Sets/Reps 6: 4 ea   Exercise 7  Exercise Name 7: flat side mini squats   Sets/Reps 7: 8    Assessment & Plan     Assessment  Assessment details: Patient is able to perform exercises #3-5 with challenge, from airex to BOSU. Pt also performed exercises on BOSU that he could replicate at home since he now has a BOSU for use at home. He will continue to be progressed this week.     Plan  Plan details: D/C at next visit.         Timed:  Manual Therapy:            0     mins  85942;  Therapeutic Exercise:    0    mins  16543;     Neuromuscular Cindy:    45    mins  88855;     Therapeutic Activity:      0     mins  85878;     Gait Trainin    mins  25172;     Electrical Stimulation:    0    mins  36309 ( );     Untimed:  Canalith Repositioning techniques _0_ 61340      Timed Treatment:   45   mins   Total Treatment:     45   mins      Nilsa Yarbrough PT, DPT, MSCS, CDP  [place] License #: [id number]  Physical Therapist

## 2020-10-27 ENCOUNTER — TREATMENT (OUTPATIENT)
Dept: PHYSICAL THERAPY | Facility: CLINIC | Age: 65
End: 2020-10-27

## 2020-10-27 DIAGNOSIS — G20 PARKINSON'S DISEASE (HCC): Primary | ICD-10-CM

## 2020-10-27 PROCEDURE — 97112 NEUROMUSCULAR REEDUCATION: CPT | Performed by: PHYSICAL THERAPIST

## 2020-10-27 PROCEDURE — 97110 THERAPEUTIC EXERCISES: CPT | Performed by: PHYSICAL THERAPIST

## 2020-10-27 NOTE — PROGRESS NOTES
Physical Therapy Daily Progress Note  Visit: 8  Date of Initial Visit: Type: THERAPY  Noted: 2020    Patient: Yasmin Sagastume   : 1955  Diagnosis/ICD-10 Code:  Parkinson's disease (CMS/[facility]) [G20]  Referring practitioner: Eder Gonzalez MD  Date of Initial Visit: Type: THERAPY  Noted: 2020  Today's Date: 10/27/2020  Patient seen for 8 sessions      Subjective:   Patient reports: he has looked at ordering balance discs.   Pain: 0/10  Clinical Progress: improved  Home Program Compliance: Yes  Treatment has included: therapeutic exercise and neuromuscular re-education    Objective   See Exercise, Manual, and Modality Logs for complete treatment.    PT Neuro   Exercise 1  Exercise Name 1: Elliptical  Equipment/Resistance 1: L1  Time: 3 min back/2 min forward   Exercise 2  Exercise Name 2: LSVT BIG exercises   Equipment/Resistance 2: flicks and counting aloud #1-2 w/ 1# wrist weight; alternating stepping onto BOSU from airex #3-5, TD's added to #6-7 w/ wrist weight   Sets/Reps 2: 10 ea   Exercise 3  Exercise Name 3: sit to stands from RB w/ 4# bar overhead   Sets/Reps 3: 10  Exercise 4  Exercise Name 4: mod plank flat side BOSU - CW and CCW rolls   Sets/Reps 4: 10 ea   Exercise 5  Exercise Name 5: mod plank in floor with flexed knees - forward/backward tips   Sets/Reps 5: 10  Exercise 6  Exercise Name 6: wrist extension stretching   Time 6: 30 sec                    Assessment & Plan     Assessment  Assessment details: Patient with improved balance with backwards stepping from airex to BOSU. He will continue to be progressed with HEP ideas using BOSU.     Plan  Plan details: Patient seen today for OT so no lapse in care during LSVT BIG program. He will be discharged tomorrow.         Timed:  Manual Therapy:            0     mins  92057;  Therapeutic Exercise:    20    mins  49373;     Neuromuscular Cindy:    25    mins  61354;    Therapeutic Activity:      0     mins  63909;     Gait Training:                  0    mins  89159;     Electrical Stimulation:    0    mins  69776 ( );     Untimed:  Canalith Repositioning techniques _0_ 94092      Timed Treatment:   45   mins   Total Treatment:     45   mins      Nilsa Yarbrough PT, ISABELT, MSCS, CDP  [place] License #: [id number]  Physical Therapist

## 2020-10-28 ENCOUNTER — TREATMENT (OUTPATIENT)
Dept: PHYSICAL THERAPY | Facility: CLINIC | Age: 65
End: 2020-10-28

## 2020-10-28 DIAGNOSIS — G20 PARKINSON'S DISEASE (HCC): Primary | ICD-10-CM

## 2020-10-28 PROCEDURE — 97112 NEUROMUSCULAR REEDUCATION: CPT | Performed by: PHYSICAL THERAPIST

## 2020-10-28 NOTE — PROGRESS NOTES
PT Discharge Summary   Visit: 9  Date of Initial Visit: Type: THERAPY  Noted: 2020    Patient: Yasmin Sagastume   : 1955  Diagnosis/ICD-10 Code:  Parkinson's disease (CMS/[facility]) [G20]  Referring practitioner: Eder Gonzalez MD  Date of Initial Visit: Type: THERAPY  Noted: 2020  Today's Date: 10/28/2020  Patient seen for 9 sessions      Subjective:   Patient reports: he feels ready to manage on his own.   Pain: 0/10  Clinical Progress: improved  Home Program Compliance: Yes  Treatment has included: neuromuscular re-education    Objective   See Exercise, Manual, and Modality Logs for complete treatment.    PT Neuro  Exercise 1  Exercise Name 1: Elliptical  Equipment/Resistance 1: L1  Time: 3 min back/2 min forward   Exercise 2  Exercise Name 2: LSVT BIG exercises   Equipment/Resistance 2: flicks and counting aloud #1-2 w/ 1# wrist weight; alternating stepping onto BOSU from airex #3-5, TD's added to #6-7 w/ wrist weight   Sets/Reps 2: 10 ea   Exercise 3  Exercise Name 3: re-assessment performed - see goals   Assessment & Plan     Assessment  Assessment details: Patient has made significant gains since beginning skilled PT intervention. He demonstrates improvement with back pain, endurance and overall strength and mobility. Patient will be discharged at this time due to completion of LSVT BIG program.   Prognosis: fair    Goals  Plan Goals: STG (2 weeks):   1. Patient will report compliance LSVT BIG program.  MET    LTG (4 visits)  1. Patient will be I with LSVT BIG program.  MET  2. Patient to perform TUG within 6 sec without LOB for improved functional mobility.MET  3. Patient to ambulate 10 meters without AD within 8 sec without LOB for improved gait katrin and functional mobility.MET  4. Patient to improve FGA score to >/= 29 /30 to decrease client's risk of falls.MET  5. Patient to improve mini-BEST test balance score to >/= 28 /28 to decrease client's risk of falls.MET        Plan  Plan  details: Patient is discharged at this time to independent management of program.         Visit Diagnoses:    ICD-10-CM ICD-9-CM   1. Parkinson's disease (CMS/[facility])  G20 332.0       Progress toward previous goals: All Met      Recommendations: Discharge      PT Signature: Nilsa Yarbrough, PT, DPT, MSCS, CDP  [place] License #: [id number]    Based upon review of the patient's progress and continued therapy plan, it is my medical opinion that Yasmin Sagastume should continue physical therapy treatment at [facility]  [address] [place] [id number] [phone].    Signature: __________________________________  Eder Gonzalez MD    Timed:  Manual Therapy:    0     mins  25404;  Therapeutic Exercise:    40     mins  98278;     Neuromuscular Cindy:    0    mins  69622;    Therapeutic Activity:     0     mins  25200;     Gait Trainin     mins  14298;     Electrical Stimulation:    0     mins  07279 ( );    Untimed:  Canalith Repositioning   0 mins  16881    Timed Treatment:   40   mins   Total Treatment:     40   mins

## 2020-11-09 ENCOUNTER — DOCUMENTATION (OUTPATIENT)
Dept: OCCUPATIONAL THERAPY | Facility: HOSPITAL | Age: 65
End: 2020-11-09

## 2020-11-09 NOTE — PROGRESS NOTES
OT D/C Note        Patient: Yasmin Sagastume   : 1955     Assessment & Plan     Assessment  Assessment details: Pt completed LSVT BIG protocol and is now independent with performance including how to grade difficulty at home. Pt has ordered Bosu and foam discs to further challenge balance. Will d/c at this time.   Prognosis: good    Goals  Plan Goals:   Pt will score 17 seconds or less R HAND on the 9HPT to demonstrate improved accuracy and speed with fine motor coordination by 4 wks.  U/A to re-test     Pt will be independent assist with LSVT BIG! Program by 4 wks to increase engagement and safety in ADL/IADL tasks. MET    STGs:    Pt will be independent assist with hand & RUE stretching HEP to promote increased function and improved comfort level during ADL/IADL tasks by 2 wks. MET            Plan  Treatment plan discussed with: patient  Plan details: D/C; pt met goals and is independent w/LSVT BIG! Protocol along with other appropriate HEPs for FMC/GMC, balance, strength and ADL/IADL performance.           OT Signature: Margo Davis MS, OTR/L, CDP  [place] License #: [id number]      Signature: __________________________________      Timed:  Manual Therapy:    0     mins  13983;  Therapeutic Exercise:    0     mins  50515;     Neuromuscular Cindy:    0    mins  12963;    Therapeutic Activity:     0     mins  35340;     Self-Care/ADL     0     mins  41799;   Sensory Int. Tech      0     mins 20076;  Ultrasound:     0     mins  10604;    Electrical Stimulation:    0     mins  32675 ( );    Untimed:  Electrical Stimulation:    0     mins  48416 ( );    Timed Treatment:   0   mins   Total Treatment:     0   mins

## 2021-01-28 ENCOUNTER — IMMUNIZATION (OUTPATIENT)
Dept: VACCINE CLINIC | Facility: HOSPITAL | Age: 66
End: 2021-01-28

## 2021-01-28 PROCEDURE — 91300 HC SARSCOV02 VAC 30MCG/0.3ML IM: CPT | Performed by: INTERNAL MEDICINE

## 2021-01-28 PROCEDURE — 0001A: CPT | Performed by: INTERNAL MEDICINE

## 2021-02-17 ENCOUNTER — IMMUNIZATION (OUTPATIENT)
Dept: VACCINE CLINIC | Facility: HOSPITAL | Age: 66
End: 2021-02-17

## 2021-02-17 PROCEDURE — 0002A: CPT | Performed by: INTERNAL MEDICINE

## 2021-02-17 PROCEDURE — 91300 HC SARSCOV02 VAC 30MCG/0.3ML IM: CPT | Performed by: INTERNAL MEDICINE

## 2021-02-18 ENCOUNTER — APPOINTMENT (OUTPATIENT)
Dept: VACCINE CLINIC | Facility: HOSPITAL | Age: 66
End: 2021-02-18

## 2023-03-13 ENCOUNTER — OFFICE VISIT (OUTPATIENT)
Dept: NEUROSURGERY | Facility: CLINIC | Age: 68
End: 2023-03-13
Payer: MEDICARE

## 2023-03-13 VITALS — HEIGHT: 70 IN | TEMPERATURE: 97.7 F | WEIGHT: 144.4 LBS | BODY MASS INDEX: 20.67 KG/M2

## 2023-03-13 DIAGNOSIS — G20 PARKINSON DISEASE: ICD-10-CM

## 2023-03-13 DIAGNOSIS — M54.50 CHRONIC LOW BACK PAIN, UNSPECIFIED BACK PAIN LATERALITY, UNSPECIFIED WHETHER SCIATICA PRESENT: Primary | ICD-10-CM

## 2023-03-13 DIAGNOSIS — G89.29 CHRONIC LOW BACK PAIN, UNSPECIFIED BACK PAIN LATERALITY, UNSPECIFIED WHETHER SCIATICA PRESENT: Primary | ICD-10-CM

## 2023-03-13 PROBLEM — G20.A1 DEMENTIA DUE TO PARKINSON'S DISEASE: Status: ACTIVE | Noted: 2023-03-13

## 2023-03-13 PROBLEM — F02.80 DEMENTIA DUE TO PARKINSON'S DISEASE: Status: ACTIVE | Noted: 2023-03-13

## 2023-03-13 PROCEDURE — 99204 OFFICE O/P NEW MOD 45 MIN: CPT

## 2023-03-13 RX ORDER — PREGABALIN 25 MG/1
25 CAPSULE ORAL 2 TIMES DAILY
COMMUNITY

## 2023-03-13 RX ORDER — LEMBOREXANT 5 MG/1
TABLET, FILM COATED ORAL
COMMUNITY

## 2023-03-13 RX ORDER — MONTELUKAST SODIUM 10 MG/1
TABLET ORAL
COMMUNITY

## 2023-03-13 RX ORDER — FLUTICASONE PROPIONATE 50 MCG
2 SPRAY, SUSPENSION (ML) NASAL DAILY
COMMUNITY

## 2023-03-13 RX ORDER — MULTIPLE VITAMINS W/ MINERALS TAB 9MG-400MCG
1 TAB ORAL DAILY
COMMUNITY

## 2023-03-13 RX ORDER — CLONAZEPAM 0.5 MG/1
0.5 TABLET ORAL DAILY
COMMUNITY

## 2023-03-13 RX ORDER — CHOLECALCIFEROL (VITAMIN D3) 125 MCG
5 CAPSULE ORAL
COMMUNITY

## 2023-03-13 NOTE — PROGRESS NOTES
Office Visit      Date: 2023  Patient Name: Yasmin Sagastume  : 1955   MRN: [id number]     Chief Complaint:    Chief Complaint   Patient presents with   • Lower back pain        History of Present Illness: Yasmin Sagastume is a 67 y.o. male who is new patient to the neurosurgical practice.  Being seen today in consultation from his primary care provider Dr. Eder Gonzalez.  Patient has chief complaint of low back pain.  Patient has past medical history consistent with osteoarthritis, right meniscal repair, cholecystectomy, peripheral neuropathy, and Parkinson's disease.  Patient states he has had chronic back pain for a long time.  Patient is a retired nuclear .  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  Patient has low back pain that is very localized to the patient's low back.  Patient admits to occasional bilateral hip pain when he is walking long distances.  Patient denies any upper or lower extremity weakness, denies any radicular symptoms, admits to occasional numbness and tingling due to his peripheral neuropathy.  Denies any urinary bladder or bowel dysfunction that would be consistent with cauda equina syndrome.  Patient states he has been in physical therapy for the last several months.  Which has helped, he has had chronic back in for a long time but the most acute flareup has been around 2022.  He states his pain right now is a 2 out of 10, it was around a 7 out of 10 originally.  Patient has had lumbar steroid injections in the past which provided significant relief.  Patient is on Lyrica 25 mg twice daily for his peripheral neuropathy.    Subjective   Review of Systems:  Review of Systems   Constitutional: Negative for activity change, appetite change, chills, diaphoresis, fatigue, fever and unexpected weight change.   HENT:  Negative for congestion, dental problem, drooling, ear discharge, ear pain, facial swelling, hearing loss, mouth sores, nosebleeds, postnasal drip, rhinorrhea, sinus pressure, sinus pain, sneezing, sore throat, tinnitus, trouble swallowing and voice change.    Eyes: Negative for photophobia, pain, discharge, redness, itching and visual disturbance.   Respiratory: Negative for apnea, cough, choking, chest tightness, shortness of breath, wheezing and stridor.    Cardiovascular: Negative for chest pain, palpitations and leg swelling.   Gastrointestinal: Negative for abdominal distention, abdominal pain, anal bleeding, blood in stool, constipation, diarrhea, nausea, rectal pain and vomiting.   Endocrine: Negative for cold intolerance, heat intolerance, polydipsia, polyphagia and polyuria.   Genitourinary: Negative for decreased urine volume, difficulty urinating, dysuria, enuresis, flank pain, frequency, genital sores, hematuria and urgency.   Musculoskeletal: Negative for arthralgias, back pain, gait problem, joint swelling, myalgias, neck pain and neck stiffness.   Skin: Negative for color change, pallor, rash and wound.   Allergic/Immunologic: Negative for environmental allergies, food allergies and immunocompromised state.   Neurological: Negative for dizziness, tremors, seizures, syncope, facial asymmetry, speech difficulty, weakness, light-headedness, numbness and headaches.   Hematological: Negative for adenopathy. Does not bruise/bleed easily.   Psychiatric/Behavioral: Negative for agitation, behavioral problems, confusion, decreased concentration, dysphoric mood, hallucinations, self-injury, sleep disturbance and suicidal ideas. The patient is not nervous/anxious and is not hyperactive.    All other systems reviewed and are negative.       Past Medical History:  Past Medical History:   Diagnosis Date   • Arthritis 2018   • Cancer (HCC)    • History of transfusion 1955    At birth   • Peripheral neuropathy  10-       Past Surgical History:  Past Surgical History:   Procedure Laterality Date   • CHOLECYSTECTOMY  2003   • EPIDURAL BLOCK  2018    Epidural steroid injections   • KNEE ARTHROSCOPY Left 1979   • KNEE MENISCAL REPAIR Right 2014       Medications    Current Outpatient Medications:   •  ACYCLOVIR EX, Apply  topically., Disp: , Rfl:   •  carbidopa-levodopa (SINEMET)  MG per tablet, Take 1 tablet by mouth 4 (Four) Times a Day., Disp: , Rfl:   •  clonazePAM (KlonoPIN) 0.5 MG tablet, Take 1 tablet by mouth Daily., Disp: , Rfl:   •  Cyanocobalamin (B-12 IJ), Inject 1 mL as directed Every 30 (Thirty) Days., Disp: , Rfl:   •  fluticasone (FLONASE) 50 MCG/ACT nasal spray, 2 sprays into the nostril(s) as directed by provider Daily., Disp: , Rfl:   •  Lemborexant (DayVigo) 5 MG tablet, Take  by mouth., Disp: , Rfl:   •  Loperamide HCl (IMODIUM PO), Take  by mouth Daily., Disp: , Rfl:   •  melatonin 5 MG tablet tablet, Take 1 tablet by mouth., Disp: , Rfl:   •  METAMUCIL FIBER PO, Take  by mouth Daily., Disp: , Rfl:   •  montelukast (SINGULAIR) 10 MG tablet, Take  by mouth., Disp: , Rfl:   •  multivitamin with minerals (CENTRUM SILVER PO), Take 1 tablet by mouth Daily., Disp: , Rfl:   •  OLOPATADINE HCL OP, Apply  to eye(s) as directed by provider., Disp: , Rfl:   •  pregabalin (LYRICA) 25 MG capsule, Take 1 capsule by mouth 2 (Two) Times a Day., Disp: , Rfl:     Allergies:  Allergies   Allergen Reactions   • Gluten Meal Diarrhea and Unknown (See Comments)   • Nsaids Other (See Comments)     Other reaction(s): Other:  Instructed not to take due to colitis.     • Erythromycin Rash and Unknown (See Comments)     Ointment   Ointment   Ointment   Ointment   Ointment   Ointment   Ointment   Ointment          Social Hx:  Social History     Tobacco Use   • Smoking status: Never   • Smokeless tobacco: Never   Vaping Use   • Vaping Use: Never used   Substance Use Topics   • Alcohol use: Never   • Drug use: Never        Family Hx:  Family History   Problem Relation Age of Onset   • Arthritis Mother    • Stroke Mother    • Diabetes Father    • Cancer Father    • Hypertension Father    • Heart disease Father    • Heart failure Father    • Diabetes Brother        Objective     Vitals:    23 1351   Temp: 97.7 °F (36.5 °C)     Body mass index is 20.72 kg/m².    Physical examination:  General Appearance:  Well developed, well nourished, well groomed, alert, and cooperative.  HEENT- normocephalic, atraumatic, sclera clear  Lungs-normal expansion, no wheezing  Heart-regular rate and rhythm  Extremities-positive pulses, no edema    Neurologic Exam     Mental Status   Oriented to person, place, and time.   Speech: speech is normal   Level of consciousness: alert    Cranial Nerves   Cranial nerves II through XII intact.     Motor Exam   Muscle bulk: normal  Overall muscle tone: normal    Strength   Right neck flexion: 5/5  Left neck flexion: 5/5  Right neck extension: 5/5  Left neck extension: 5/5  Right deltoid: 5/5  Left deltoid: 5/5  Right biceps: 5/5  Left biceps: 5/5  Right triceps: 5/5  Left triceps: 5/5  Right wrist flexion: 5/5  Left wrist flexion: 5/5  Right wrist extension: 5/5  Left wrist extension: 5/5  Right interossei: 5/5  Left interossei: 5/5  Right abdominals: 5/5  Left abdominals: 5/5  Right iliopsoas: 5/5  Left iliopsoas: 5/5  Right quadriceps: 5/5  Left quadriceps: 5/5  Right hamstrin/5  Left hamstrin/5  Right glutei: 5/5  Left glutei: 5/5  Right anterior tibial: 5/5  Left anterior tibial: 5/5  Right posterior tibial: 5/5  Left posterior tibial: 5/5  Right peroneal: 5/5  Left peroneal: 5/5  Right gastroc: 5/5  Left gastroc: 5/5    Sensory Exam   Light touch normal.     Gait, Coordination, and Reflexes     Gait  Gait: normal    Tremor   Resting tremor: present    Reflexes   Right brachioradialis: 2+  Left brachioradialis: 2+  Right biceps: 2+  Left biceps: 2+  Right triceps: 2+  Left triceps: 2+  Right  patellar: 2+  Left patellar: 2+  Right achilles: 2+  Left achilles: 2+  Right : 2+  Left : 2+  Right Turcios: absent  Left Turcios: absent  Right ankle clonus: absent  Left pendular knee jerk: absent    WDWN  A/A/C, speech clear, attention normal, conversant, answers questions appropriately, good historian.  Cranial nerves II through XII are intact.  Motor examination does not reveal weakness in the , upper or lower extremities.   Sensation is intact.  Gait is normal, balance is normal.   No tremors are noted.  Reflexes are intact.   Turcios is negative. Clonus is negative.       Review of imaging: No new imaging studies to review at this visit.    Assessment & Plan     1. Chronic low back pain, unspecified back pain laterality, unspecified whether sciatica present  - MRI Lumbar Spine Without Contrast; Future    2. Parkinson disease (HCC)        Plan:  67-year-old male with a history of Parkinson's disease, chronic low back pain, peripheral neuropathy.  Presents today the neurosurgical clinic with a chief complaint of low back pain that does not really radiate.  Patient states that is radiated in the past, most recent flareup was in October 2022, patient has been undergoing physical therapy since then and has had some relief from his back pain.  Patient has pretty focal axial back pain localized to the lower lumbar region.  Patient states that the pain is worse when he is up and moving around/or if he is walked long periods of time, or when he has been out working in the garden all day.  Patient has tried muscle relaxers and ibuprofen and Tylenol she also provide some relief.  Patient is not had an updated MRI of his lumbar spine since 2018, was told that he has some osteoarthritis and degenerative disc disease from prior scans.  I think appropriate to update this patient's MRI of the lumbar spine, since he has not had one in several years.  Seeing that he is already tried physical therapy, and has not  provided much relief I would need an MRI of the lumbar spine to assess for any canal stenosis/degenerative disc disease, or any neuroforaminal stenosis may be attributed this patient's symptoms.  He does have occasional symptoms that would contribute to some degree of canal stenosis, however he does not have the typical neurogenic claudication symptoms.  I plan to see this patient back in 4 weeks time with a new MRI lumbar spine to further assess his spinal pathology.  In the interim the patient is to stay on his Lyrica 25 mg p.o. twice daily.  If he has any urinary bladder or bowel dysfunction he is to call our office, call [phone] report the nearest emergency room.      Eder Diop PA-C  [facility] [address] [address]  Fax [phone]  Phone [phone]

## 2023-04-18 ENCOUNTER — HOSPITAL ENCOUNTER (OUTPATIENT)
Dept: MRI IMAGING | Facility: HOSPITAL | Age: 68
Discharge: HOME | End: 2023-04-18
Payer: MEDICARE

## 2023-04-18 DIAGNOSIS — M54.50 CHRONIC LOW BACK PAIN, UNSPECIFIED BACK PAIN LATERALITY, UNSPECIFIED WHETHER SCIATICA PRESENT: ICD-10-CM

## 2023-04-18 DIAGNOSIS — G89.29 CHRONIC LOW BACK PAIN, UNSPECIFIED BACK PAIN LATERALITY, UNSPECIFIED WHETHER SCIATICA PRESENT: ICD-10-CM

## 2023-04-18 PROCEDURE — 72148 MRI LUMBAR SPINE W/O DYE: CPT

## 2023-04-24 ENCOUNTER — OFFICE VISIT (OUTPATIENT)
Dept: NEUROSURGERY | Facility: CLINIC | Age: 68
End: 2023-04-24
Payer: MEDICARE

## 2023-04-24 VITALS
SYSTOLIC BLOOD PRESSURE: 128 MMHG | WEIGHT: 144.2 LBS | HEIGHT: 70 IN | HEART RATE: 70 BPM | BODY MASS INDEX: 20.64 KG/M2 | DIASTOLIC BLOOD PRESSURE: 77 MMHG | TEMPERATURE: 98 F

## 2023-04-24 DIAGNOSIS — G89.29 CHRONIC LOW BACK PAIN, UNSPECIFIED BACK PAIN LATERALITY, UNSPECIFIED WHETHER SCIATICA PRESENT: Primary | ICD-10-CM

## 2023-04-24 DIAGNOSIS — M54.50 CHRONIC LOW BACK PAIN, UNSPECIFIED BACK PAIN LATERALITY, UNSPECIFIED WHETHER SCIATICA PRESENT: Primary | ICD-10-CM

## 2023-04-24 DIAGNOSIS — G20 PARKINSON DISEASE: ICD-10-CM

## 2023-04-24 RX ORDER — CARBAMAZEPINE 200 MG/1
200 TABLET, EXTENDED RELEASE ORAL 2 TIMES DAILY
COMMUNITY

## 2023-04-24 NOTE — PROGRESS NOTES
Office Visit      Date: 2023  Patient Name: Yasmin Sagastume  : 1955   MRN: [id number]     Chief Complaint:    Chief Complaint   Patient presents with   • Lower back pain       History of Present Illness: Yasmin Sagastume is a 67 y.o. male who is here today for a follow-up appointment and to review his new MRI of the lumbar spine.  Mr. Sagastume is a well-known patient to the neurosurgical practice under my service. Patient has chief complaint of low back pain.  Patient has past medical history consistent with osteoarthritis, right meniscal repair, cholecystectomy, peripheral neuropathy, and Parkinson's disease.  Patient states he has had chronic back pain for a long time.  Patient is a retired nuclear .  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  Patient has low back pain that is very localized to the patient's low back.  Patient admits to occasional bilateral hip pain when he is walking long distances.  Patient denies any upper or lower extremity weakness, denies any radicular symptoms, admits to occasional numbness and tingling due to his peripheral neuropathy.  Denies any urinary bladder or bowel dysfunction that would be consistent with cauda equina syndrome.  Patient states he has been in physical therapy for the last several months.  Which has helped, he has had chronic back in for a long time but the most acute flareup has been around 2022.  He states his pain right now is a 2 out of 10, it was around a 7 out of 10 originally.  Patient has had lumbar steroid injections in the past which provided significant relief.  Patient is on Lyrica 25 mg twice daily for his peripheral neuropathy.    Patient has no acute complaints today, and actually endorses quite the improvement in his chronic back pain.  Patient has no upper or lower extremity  weakness, no radicular symptoms currently, no numbness/tingling aside from his baseline Parkinson's disease symptoms and gait.  Patient however, states that he is just recently been started on Tegretol by an outside provider.  Patient states approximately 3 weeks ago he had onset of acute episodic severe stabbing sharp pain starting around the left side of his face into his jaw area.  Patient states at first he thought this was a dental issue and got into see his dentist, which was unremarkable.  Patient was told by an outside provider he had trigeminal neuralgia and was started on Tegretol which provided quite the relief.    Subjective   Review of Systems:  Review of Systems   Constitutional: Negative for activity change, appetite change, chills, diaphoresis, fatigue, fever and unexpected weight change.   HENT: Negative for congestion, dental problem, drooling, ear discharge, ear pain, facial swelling, hearing loss, mouth sores, nosebleeds, postnasal drip, rhinorrhea, sinus pressure, sinus pain, sneezing, sore throat, tinnitus, trouble swallowing and voice change.    Eyes: Negative for photophobia, pain, discharge, redness, itching and visual disturbance.   Respiratory: Negative for apnea, cough, choking, chest tightness, shortness of breath, wheezing and stridor.    Cardiovascular: Negative for chest pain, palpitations and leg swelling.   Gastrointestinal: Negative for abdominal distention, abdominal pain, anal bleeding, blood in stool, constipation, diarrhea, nausea, rectal pain and vomiting.   Endocrine: Negative for cold intolerance, heat intolerance, polydipsia, polyphagia and polyuria.   Genitourinary: Negative for decreased urine volume, difficulty urinating, dysuria, enuresis, flank pain, frequency, genital sores, hematuria and urgency.   Musculoskeletal: Negative for arthralgias, back pain, gait problem, joint swelling, myalgias, neck pain and neck stiffness.   Skin: Negative for color change, pallor, rash  and wound.   Allergic/Immunologic: Negative for environmental allergies, food allergies and immunocompromised state.   Neurological: Negative for dizziness, tremors, seizures, syncope, facial asymmetry, speech difficulty, weakness, light-headedness, numbness and headaches.   Hematological: Negative for adenopathy. Does not bruise/bleed easily.   Psychiatric/Behavioral: Negative for agitation, behavioral problems, confusion, decreased concentration, dysphoric mood, hallucinations, self-injury, sleep disturbance and suicidal ideas. The patient is not nervous/anxious and is not hyperactive.    All other systems reviewed and are negative.       Past Medical History:  Past Medical History:   Diagnosis Date   • Arthritis 2018   • Cancer    • History of transfusion 1955    At birth   • Peripheral neuropathy 10-   • Trigeminal neuralgia 2023       Past Surgical History:  Past Surgical History:   Procedure Laterality Date   • CHOLECYSTECTOMY  2003   • EPIDURAL BLOCK  2018    Epidural steroid injections   • KNEE ARTHROSCOPY Left 1979   • KNEE MENISCAL REPAIR Right 2014       Medications    Current Outpatient Medications:   •  ACYCLOVIR EX, Apply  topically., Disp: , Rfl:   •  carBAMazepine XR (TEGretol  XR) 200 MG 12 hr tablet, Take 1 tablet by mouth 2 (Two) Times a Day., Disp: , Rfl:   •  carbidopa-levodopa (SINEMET)  MG per tablet, Take 1 tablet by mouth 4 (Four) Times a Day., Disp: , Rfl:   •  clonazePAM (KlonoPIN) 0.5 MG tablet, Take 1 tablet by mouth Daily., Disp: , Rfl:   •  Cyanocobalamin (B-12 IJ), Inject 1 mL as directed Every 30 (Thirty) Days., Disp: , Rfl:   •  fluticasone (FLONASE) 50 MCG/ACT nasal spray, 2 sprays into the nostril(s) as directed by provider Daily., Disp: , Rfl:   •  Lemborexant (DayVigo) 5 MG tablet, Take  by mouth., Disp: , Rfl:   •  Loperamide HCl (IMODIUM PO), Take  by mouth Daily., Disp: , Rfl:   •  melatonin 5 MG tablet tablet, Take 1 tablet by mouth., Disp: , Rfl:   •   METAMUCIL FIBER PO, Take  by mouth Daily., Disp: , Rfl:   •  montelukast (SINGULAIR) 10 MG tablet, Take  by mouth., Disp: , Rfl:   •  multivitamin with minerals tablet tablet, Take 1 tablet by mouth Daily., Disp: , Rfl:   •  OLOPATADINE HCL OP, Apply  to eye(s) as directed by provider., Disp: , Rfl:   •  pregabalin (LYRICA) 25 MG capsule, Take 1 capsule by mouth 2 (Two) Times a Day., Disp: , Rfl:     Allergies:  Allergies   Allergen Reactions   • Gluten Meal Diarrhea and Unknown (See Comments)   • Nsaids Other (See Comments)     Other reaction(s): Other:  Instructed not to take due to colitis.     • Erythromycin Rash and Unknown (See Comments)     Ointment   Ointment   Ointment   Ointment   Ointment   Ointment   Ointment   Ointment          Social Hx:  Social History     Tobacco Use   • Smoking status: Never   • Smokeless tobacco: Never   Vaping Use   • Vaping Use: Never used   Substance Use Topics   • Alcohol use: Never   • Drug use: Never       Family Hx:  Family History   Problem Relation Age of Onset   • Arthritis Mother    • Stroke Mother    • Diabetes Father    • Cancer Father    • Hypertension Father    • Heart disease Father    • Heart failure Father    • Diabetes Brother        Objective     Vitals:    04/24/23 1415   BP: 128/77   Pulse: 70   Temp: 98 °F (36.7 °C)     Body mass index is 20.69 kg/m².    Physical examination:  General Appearance:  Well developed, well nourished, well groomed, alert, and cooperative.  HEENT- normocephalic, atraumatic, sclera clear  Lungs-normal expansion, no wheezing  Heart-regular rate and rhythm  Extremities-positive pulses, no edema    Neurologic Exam  WDWN  A/A/C, speech clear, attention normal, conversant, answers questions appropriately, good historian.  Cranial nerves II through XII are intact.  Motor examination does not reveal weakness in the , upper or lower extremities.   Sensation is intact.  Gait is normal, balance is normal.   No tremors are noted.  Reflexes  are intact.   Turcios is negative. Clonus is negative.   No tenderness palpation    Review of imaging: Patient has multilevel mild to moderate lumbar spondylosis, no high-grade spinal canal or neuroforaminal impingement.  Patient does have a circumferential disc bulge and bilateral facet arthropathy with mild bilateral neuroforaminal stenosis at L4-L5, patient at L5-S1 has circumferential disc bulge, bilateral facet arthropathy, spinal canal is widely patent, with moderate bilateral neuroforaminal narrowing, patient at L3-4 has small disc bulge and bilateral facet arthropathy, with mild bilateral neuroforaminal narrowing.      Assessment & Plan     1. Chronic low back pain, unspecified back pain laterality, unspecified whether sciatica present    2. 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  Patient is well controlled on his current medication regimen of carbidopa levodopa, and Lyrica.  Patient states today that his chronic low back pain is actually better, patient states he will have flareups however with conservative measures patient's flareups tend to improve.  Patient denies any real back pain today, no radicular symptoms, no upper or lower extremity weakness, no numbness/tingling, no bladder or bowel dysfunction.  Patient MRI of the lumbar spine shows multilevel mild to moderate lumbar spondylosis throughout, with no high-grade spinal canal or neuroforaminal impingement.  He does have several disc bulges and some facet arthropathy present at L4-L5 and L5-S1.  I encouraged patient continue to try conservative measures with physical therapy, exercises, over-the-counter medications, heat ice, drawl Dosepak, muscle relaxers when he does experience flareups.  If the patient experiences  another acute flareup we will be happy to send him in a referral to physical therapy/pain management for epidural steroid injections.  Again, patient does not have any high-grade spinal canal stenosis and essentially his symptoms are much improved.  Patient however, states some new symptoms that came on about 3 weeks ago, that are consistent with a diagnosis of trigeminal neuralgia which was made by an outside provider.  Patient's been started on Tegretol and denies any of the severe sharp stabbing pain that he experienced in the trigeminal nerve distribution of the patient's left side face.  I encouraged the patient if he experiences any more the symptoms, he is first to see his neurologist/provider that diagnosed this.  However, if these trigeminal neuralgia symptoms become refractory to medication he can call our office anytime and we will be happy to see him for trigeminal neuralgia as well.  As of right now we can see the patient on as-needed basis.  In the interim if the patient experiences any new onset acute worsening of symptoms, any lower extremity weakness, any urinary bladder or bowel dysfunction he is to call our office and/or 911 or report to the nearest emergency room.      Eder Diop PA-C  [facility] [place]  [address]    [facility] [address]  Fax [phone]  Phone [phone]

## 2023-04-24 NOTE — LETTER
2023       No Recipients    Patient: Yasmin Sagastume   YOB: 1955   Date of Visit: 2023       Dear Dr. Ricks Recipients:    Thank you for referring Yasmin Sagastume to me for evaluation. Below are the relevant portions of my assessment and plan of care.    If you have questions, please do not hesitate to call me. I look forward to following Yasmin along with you.         Sincerely,        Eder Diop PA-C        CC:   No Recipients    Eder Diop PA-C  [phone]  Signed       Office Visit      Date: 2023  Patient Name: Yasmin Sagastume  : 1955   MRN: [id number]     Chief Complaint:    Chief Complaint   Patient presents with   • Lower back pain       History of Present Illness: Yasmin Sagastume is a 67 y.o. male who is here today for a follow-up appointment and to review his new MRI of the lumbar spine.  Mr. Sagastume is a well-known patient to the neurosurgical practice under my service. Patient has chief complaint of low back pain.  Patient has past medical history consistent with osteoarthritis, right meniscal repair, cholecystectomy, peripheral neuropathy, and Parkinson's disease.  Patient states he has had chronic back pain for a long time.  Patient is a retired nuclear .  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  Patient has low back pain that is very localized to the patient's low back.  Patient admits to occasional bilateral hip pain when he is walking long distances.  Patient denies any upper or lower extremity weakness, denies any radicular symptoms, admits to occasional numbness and tingling due to his peripheral neuropathy.  Denies any urinary bladder or bowel dysfunction that would be consistent with cauda equina syndrome.  Patient states he has been in physical therapy for the last several months.   Which has helped, he has had chronic back in for a long time but the most acute flareup has been around October 2022.  He states his pain right now is a 2 out of 10, it was around a 7 out of 10 originally.  Patient has had lumbar steroid injections in the past which provided significant relief.  Patient is on Lyrica 25 mg twice daily for his peripheral neuropathy.    Patient has no acute complaints today, and actually endorses quite the improvement in his chronic back pain.  Patient has no upper or lower extremity weakness, no radicular symptoms currently, no numbness/tingling aside from his baseline Parkinson's disease symptoms and gait.  Patient however, states that he is just recently been started on Tegretol by an outside provider.  Patient states approximately 3 weeks ago he had onset of acute episodic severe stabbing sharp pain starting around the left side of his face into his jaw area.  Patient states at first he thought this was a dental issue and got into see his dentist, which was unremarkable.  Patient was told by an outside provider he had trigeminal neuralgia and was started on Tegretol which provided quite the relief.    Subjective   Review of Systems:  Review of Systems   Constitutional: Negative for activity change, appetite change, chills, diaphoresis, fatigue, fever and unexpected weight change.   HENT: Negative for congestion, dental problem, drooling, ear discharge, ear pain, facial swelling, hearing loss, mouth sores, nosebleeds, postnasal drip, rhinorrhea, sinus pressure, sinus pain, sneezing, sore throat, tinnitus, trouble swallowing and voice change.    Eyes: Negative for photophobia, pain, discharge, redness, itching and visual disturbance.   Respiratory: Negative for apnea, cough, choking, chest tightness, shortness of breath, wheezing and stridor.    Cardiovascular: Negative for chest pain, palpitations and leg swelling.   Gastrointestinal: Negative for abdominal distention, abdominal  pain, anal bleeding, blood in stool, constipation, diarrhea, nausea, rectal pain and vomiting.   Endocrine: Negative for cold intolerance, heat intolerance, polydipsia, polyphagia and polyuria.   Genitourinary: Negative for decreased urine volume, difficulty urinating, dysuria, enuresis, flank pain, frequency, genital sores, hematuria and urgency.   Musculoskeletal: Negative for arthralgias, back pain, gait problem, joint swelling, myalgias, neck pain and neck stiffness.   Skin: Negative for color change, pallor, rash and wound.   Allergic/Immunologic: Negative for environmental allergies, food allergies and immunocompromised state.   Neurological: Negative for dizziness, tremors, seizures, syncope, facial asymmetry, speech difficulty, weakness, light-headedness, numbness and headaches.   Hematological: Negative for adenopathy. Does not bruise/bleed easily.   Psychiatric/Behavioral: Negative for agitation, behavioral problems, confusion, decreased concentration, dysphoric mood, hallucinations, self-injury, sleep disturbance and suicidal ideas. The patient is not nervous/anxious and is not hyperactive.    All other systems reviewed and are negative.       Past Medical History:  Past Medical History:   Diagnosis Date   • Arthritis 2018   • Cancer    • History of transfusion 1955    At birth   • Peripheral neuropathy 10-   • Trigeminal neuralgia 2023       Past Surgical History:  Past Surgical History:   Procedure Laterality Date   • CHOLECYSTECTOMY  2003   • EPIDURAL BLOCK  2018    Epidural steroid injections   • KNEE ARTHROSCOPY Left 1979   • KNEE MENISCAL REPAIR Right 2014       Medications    Current Outpatient Medications:   •  ACYCLOVIR EX, Apply  topically., Disp: , Rfl:   •  carBAMazepine XR (TEGretol  XR) 200 MG 12 hr tablet, Take 1 tablet by mouth 2 (Two) Times a Day., Disp: , Rfl:   •  carbidopa-levodopa (SINEMET)  MG per tablet, Take 1 tablet by mouth 4 (Four) Times a Day., Disp: , Rfl:    •  clonazePAM (KlonoPIN) 0.5 MG tablet, Take 1 tablet by mouth Daily., Disp: , Rfl:   •  Cyanocobalamin (B-12 IJ), Inject 1 mL as directed Every 30 (Thirty) Days., Disp: , Rfl:   •  fluticasone (FLONASE) 50 MCG/ACT nasal spray, 2 sprays into the nostril(s) as directed by provider Daily., Disp: , Rfl:   •  Lemborexant (DayVigo) 5 MG tablet, Take  by mouth., Disp: , Rfl:   •  Loperamide HCl (IMODIUM PO), Take  by mouth Daily., Disp: , Rfl:   •  melatonin 5 MG tablet tablet, Take 1 tablet by mouth., Disp: , Rfl:   •  METAMUCIL FIBER PO, Take  by mouth Daily., Disp: , Rfl:   •  montelukast (SINGULAIR) 10 MG tablet, Take  by mouth., Disp: , Rfl:   •  multivitamin with minerals tablet tablet, Take 1 tablet by mouth Daily., Disp: , Rfl:   •  OLOPATADINE HCL OP, Apply  to eye(s) as directed by provider., Disp: , Rfl:   •  pregabalin (LYRICA) 25 MG capsule, Take 1 capsule by mouth 2 (Two) Times a Day., Disp: , Rfl:     Allergies:  Allergies   Allergen Reactions   • Gluten Meal Diarrhea and Unknown (See Comments)   • Nsaids Other (See Comments)     Other reaction(s): Other:  Instructed not to take due to colitis.     • Erythromycin Rash and Unknown (See Comments)     Ointment   Ointment   Ointment   Ointment   Ointment   Ointment   Ointment   Ointment          Social Hx:  Social History     Tobacco Use   • Smoking status: Never   • Smokeless tobacco: Never   Vaping Use   • Vaping Use: Never used   Substance Use Topics   • Alcohol use: Never   • Drug use: Never       Family Hx:  Family History   Problem Relation Age of Onset   • Arthritis Mother    • Stroke Mother    • Diabetes Father    • Cancer Father    • Hypertension Father    • Heart disease Father    • Heart failure Father    • Diabetes Brother        Objective     Vitals:    04/24/23 1415   BP: 128/77   Pulse: 70   Temp: 98 °F (36.7 °C)     Body mass index is 20.69 kg/m².    Physical examination:  General Appearance:  Well developed, well nourished, well groomed,  alert, and cooperative.  HEENT- normocephalic, atraumatic, sclera clear  Lungs-normal expansion, no wheezing  Heart-regular rate and rhythm  Extremities-positive pulses, no edema    Neurologic Exam  WDWN  A/A/C, speech clear, attention normal, conversant, answers questions appropriately, good historian.  Cranial nerves II through XII are intact.  Motor examination does not reveal weakness in the , upper or lower extremities.   Sensation is intact.  Gait is normal, balance is normal.   No tremors are noted.  Reflexes are intact.   Turcios is negative. Clonus is negative.   No tenderness palpation    Review of imaging: Patient has multilevel mild to moderate lumbar spondylosis, no high-grade spinal canal or neuroforaminal impingement.  Patient does have a circumferential disc bulge and bilateral facet arthropathy with mild bilateral neuroforaminal stenosis at L4-L5, patient at L5-S1 has circumferential disc bulge, bilateral facet arthropathy, spinal canal is widely patent, with moderate bilateral neuroforaminal narrowing, patient at L3-4 has small disc bulge and bilateral facet arthropathy, with mild bilateral neuroforaminal narrowing.      Assessment & Plan     1. Chronic low back pain, unspecified back pain laterality, unspecified whether sciatica present    2. 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  Patient is well controlled on his current medication regimen of carbidopa levodopa, and Lyrica.  Patient states today that his chronic low back pain is actually better, patient states he will have flareups however with conservative measures patient's flareups tend to improve.  Patient denies any real back pain today, no radicular symptoms, no upper or lower extremity weakness, no  numbness/tingling, no bladder or bowel dysfunction.  Patient MRI of the lumbar spine shows multilevel mild to moderate lumbar spondylosis throughout, with no high-grade spinal canal or neuroforaminal impingement.  He does have several disc bulges and some facet arthropathy present at L4-L5 and L5-S1.  I encouraged patient continue to try conservative measures with physical therapy, exercises, over-the-counter medications, heat ice, drawl Dosepak, muscle relaxers when he does experience flareups.  If the patient experiences another acute flareup we will be happy to send him in a referral to physical therapy/pain management for epidural steroid injections.  Again, patient does not have any high-grade spinal canal stenosis and essentially his symptoms are much improved.  Patient however, states some new symptoms that came on about 3 weeks ago, that are consistent with a diagnosis of trigeminal neuralgia which was made by an outside provider.  Patient's been started on Tegretol and denies any of the severe sharp stabbing pain that he experienced in the trigeminal nerve distribution of the patient's left side face.  I encouraged the patient if he experiences any more the symptoms, he is first to see his neurologist/provider that diagnosed this.  However, if these trigeminal neuralgia symptoms become refractory to medication he can call our office anytime and we will be happy to see him for trigeminal neuralgia as well.  As of right now we can see the patient on as-needed basis.  In the interim if the patient experiences any new onset acute worsening of symptoms, any lower extremity weakness, any urinary bladder or bowel dysfunction he is to call our office and/or 911 or report to the nearest emergency room.      Eder Diop PA-C  [facility] [address] [id number]  Fax [phone]  Phone [phone]

## 2025-07-11 ENCOUNTER — TRANSCRIBE ORDERS (OUTPATIENT)
Dept: ADMINISTRATIVE | Facility: HOSPITAL | Age: 70
End: 2025-07-11
Payer: COMMERCIAL

## 2025-07-11 DIAGNOSIS — R01.0 STILL'S MURMUR: ICD-10-CM

## 2025-07-11 DIAGNOSIS — I25.110 ATHEROSCLEROSIS OF NATIVE CORONARY ARTERY WITH UNSTABLE ANGINA PECTORIS, UNSPECIFIED WHETHER NATIVE OR TRANSPLANTED HEART: Primary | ICD-10-CM
